# Patient Record
Sex: FEMALE | Race: WHITE | Employment: FULL TIME | URBAN - METROPOLITAN AREA
[De-identification: names, ages, dates, MRNs, and addresses within clinical notes are randomized per-mention and may not be internally consistent; named-entity substitution may affect disease eponyms.]

---

## 2017-01-03 ENCOUNTER — OFFICE VISIT (OUTPATIENT)
Dept: FAMILY MEDICINE CLINIC | Facility: CLINIC | Age: 32
End: 2017-01-03

## 2017-01-03 VITALS
HEART RATE: 77 BPM | OXYGEN SATURATION: 100 % | DIASTOLIC BLOOD PRESSURE: 80 MMHG | HEIGHT: 65 IN | WEIGHT: 249.38 LBS | TEMPERATURE: 99 F | BODY MASS INDEX: 41.55 KG/M2 | SYSTOLIC BLOOD PRESSURE: 130 MMHG | RESPIRATION RATE: 18 BRPM

## 2017-01-03 DIAGNOSIS — J30.81 ALLERGY TO DOGS: ICD-10-CM

## 2017-01-03 DIAGNOSIS — J01.40 SUBACUTE PANSINUSITIS: Primary | ICD-10-CM

## 2017-01-03 PROCEDURE — 99214 OFFICE O/P EST MOD 30 MIN: CPT | Performed by: FAMILY MEDICINE

## 2017-01-03 RX ORDER — SULFAMETHOXAZOLE AND TRIMETHOPRIM 800; 160 MG/1; MG/1
1 TABLET ORAL 2 TIMES DAILY
Qty: 20 TABLET | Refills: 0 | Status: SHIPPED | OUTPATIENT
Start: 2017-01-03 | End: 2017-01-13

## 2017-01-03 NOTE — PROGRESS NOTES
HPI:   Calista Sparks is a 33year old female who presents for upper respiratory symptoms for  1  months.  Patient reports she started to improve with the zpack and steroid in early dec, but as soon as she stopped the meds (within a few days) she started f • Visual impairment      blurry vision   • Muscle weakness      bilateral LEs   • Calculus of kidney    • Attention deficit disorder    • Pseudotumor cerebri syndrome           Past Surgical History    TUBAL LIGATION        Family History   Problem Relat PLAN:   abx indicated; cont nasal steroid; she is holding off on antihistamine in case of allergy testing at upcoming ENT appointment; if not improving in a few days will add steroid pack  The patient indicates agreement and understanding  There are no Anell Bill

## 2017-01-05 RX ORDER — CLONAZEPAM 0.5 MG/1
TABLET ORAL
Qty: 30 TABLET | Refills: 0 | Status: SHIPPED | OUTPATIENT
Start: 2017-01-05 | End: 2017-03-02

## 2017-01-05 NOTE — TELEPHONE ENCOUNTER
Last refill: 12/10/16 #30 with 0 refills  Last Visit: 1-3-2017  Next Visit: none scheduled    Forward to Dr. Nolan Beatty please advise on refills. Thanks.

## 2017-02-01 RX ORDER — DEXTROAMPHETAMINE SACCHARATE, AMPHETAMINE ASPARTATE, DEXTROAMPHETAMINE SULFATE AND AMPHETAMINE SULFATE 7.5; 7.5; 7.5; 7.5 MG/1; MG/1; MG/1; MG/1
30 TABLET ORAL 2 TIMES DAILY
Qty: 60 TABLET | Refills: 0 | Status: SHIPPED | OUTPATIENT
Start: 2017-02-01 | End: 2017-02-28

## 2017-02-01 NOTE — TELEPHONE ENCOUNTER
Last refill: 1/8/17 #60 with 0 refills    Last Visit: 1/3/17    Next Visit: No future appointments. Forward to Dr. Evert Clay please advise on refills. Thanks.

## 2017-02-28 RX ORDER — DEXTROAMPHETAMINE SACCHARATE, AMPHETAMINE ASPARTATE, DEXTROAMPHETAMINE SULFATE AND AMPHETAMINE SULFATE 7.5; 7.5; 7.5; 7.5 MG/1; MG/1; MG/1; MG/1
30 TABLET ORAL 2 TIMES DAILY
Qty: 60 TABLET | Refills: 0 | Status: SHIPPED | OUTPATIENT
Start: 2017-02-28 | End: 2017-03-27

## 2017-03-03 RX ORDER — CLONAZEPAM 0.5 MG/1
TABLET ORAL
Qty: 30 TABLET | Refills: 0 | Status: SHIPPED | OUTPATIENT
Start: 2017-03-03 | End: 2017-04-27

## 2017-03-25 ENCOUNTER — TELEPHONE (OUTPATIENT)
Dept: FAMILY MEDICINE CLINIC | Facility: CLINIC | Age: 32
End: 2017-03-25

## 2017-03-27 ENCOUNTER — TELEPHONE (OUTPATIENT)
Dept: FAMILY MEDICINE CLINIC | Facility: CLINIC | Age: 32
End: 2017-03-27

## 2017-03-27 RX ORDER — DEXTROAMPHETAMINE SACCHARATE, AMPHETAMINE ASPARTATE, DEXTROAMPHETAMINE SULFATE AND AMPHETAMINE SULFATE 7.5; 7.5; 7.5; 7.5 MG/1; MG/1; MG/1; MG/1
30 TABLET ORAL 2 TIMES DAILY
Qty: 60 TABLET | Refills: 0 | Status: SHIPPED | OUTPATIENT
Start: 2017-03-27 | End: 2017-04-25

## 2017-04-06 ENCOUNTER — OFFICE VISIT (OUTPATIENT)
Dept: FAMILY MEDICINE CLINIC | Facility: CLINIC | Age: 32
End: 2017-04-06

## 2017-04-06 VITALS
HEART RATE: 78 BPM | TEMPERATURE: 98 F | SYSTOLIC BLOOD PRESSURE: 136 MMHG | BODY MASS INDEX: 41 KG/M2 | OXYGEN SATURATION: 98 % | WEIGHT: 246 LBS | DIASTOLIC BLOOD PRESSURE: 70 MMHG

## 2017-04-06 DIAGNOSIS — J01.40 ACUTE NON-RECURRENT PANSINUSITIS: Primary | ICD-10-CM

## 2017-04-06 PROCEDURE — 99214 OFFICE O/P EST MOD 30 MIN: CPT | Performed by: FAMILY MEDICINE

## 2017-04-06 RX ORDER — AMOXICILLIN AND CLAVULANATE POTASSIUM 875; 125 MG/1; MG/1
1 TABLET, FILM COATED ORAL 2 TIMES DAILY
Qty: 14 TABLET | Refills: 0 | Status: SHIPPED | OUTPATIENT
Start: 2017-04-06 | End: 2017-04-13

## 2017-04-06 NOTE — PROGRESS NOTES
HPI:   Carlos Crespo is a 33year old female who presents for upper respiratory symptoms for 2 days of bad symptoms, but the past week or so mild congestion and sneezing figured it was allergies.  Symptoms the past 2 days include facial pain, head pain, e Disorder Father 64     CABGx3   • Psychiatric Mother      alcoholic   • Cancer Sister 28     melanoma   • Other[other] [OTHER] Other      kidney transplant (alports)        Smoking Status: Current Some Day Smoker         Packs/Day: 0.25  Years: 12        T this encounter. Meds & Refills for this Visit:  Signed Prescriptions Disp Refills    Amoxicillin-Pot Clavulanate 875-125 MG Oral Tab 14 tablet 0      Sig: Take 1 tablet by mouth 2 (two) times daily.  x7 days           Imaging & Consults:  None

## 2017-04-25 RX ORDER — DEXTROAMPHETAMINE SACCHARATE, AMPHETAMINE ASPARTATE, DEXTROAMPHETAMINE SULFATE AND AMPHETAMINE SULFATE 7.5; 7.5; 7.5; 7.5 MG/1; MG/1; MG/1; MG/1
30 TABLET ORAL 2 TIMES DAILY
Qty: 60 TABLET | Refills: 0 | Status: SHIPPED | OUTPATIENT
Start: 2017-04-25 | End: 2017-05-22

## 2017-04-27 RX ORDER — CLONAZEPAM 0.5 MG/1
TABLET ORAL
Qty: 30 TABLET | Refills: 0 | Status: SHIPPED | OUTPATIENT
Start: 2017-04-27 | End: 2017-05-25

## 2017-05-13 ENCOUNTER — OFFICE VISIT (OUTPATIENT)
Dept: FAMILY MEDICINE CLINIC | Facility: CLINIC | Age: 32
End: 2017-05-13

## 2017-05-13 VITALS
OXYGEN SATURATION: 99 % | TEMPERATURE: 98 F | DIASTOLIC BLOOD PRESSURE: 80 MMHG | SYSTOLIC BLOOD PRESSURE: 108 MMHG | HEART RATE: 96 BPM | RESPIRATION RATE: 20 BRPM

## 2017-05-13 DIAGNOSIS — H65.192 OTHER ACUTE NONSUPPURATIVE OTITIS MEDIA OF LEFT EAR: ICD-10-CM

## 2017-05-13 DIAGNOSIS — J45.31 MILD PERSISTENT ASTHMA WITH ACUTE EXACERBATION: Primary | ICD-10-CM

## 2017-05-13 PROCEDURE — 87880 STREP A ASSAY W/OPTIC: CPT | Performed by: NURSE PRACTITIONER

## 2017-05-13 PROCEDURE — 99214 OFFICE O/P EST MOD 30 MIN: CPT | Performed by: NURSE PRACTITIONER

## 2017-05-13 RX ORDER — PREDNISONE 20 MG/1
20 TABLET ORAL DAILY
Qty: 5 TABLET | Refills: 0 | Status: SHIPPED | OUTPATIENT
Start: 2017-05-13 | End: 2017-05-18

## 2017-05-13 RX ORDER — CODEINE PHOSPHATE AND GUAIFENESIN 10; 100 MG/5ML; MG/5ML
10 SOLUTION ORAL EVERY 6 HOURS PRN
Qty: 120 ML | Refills: 0 | Status: SHIPPED | OUTPATIENT
Start: 2017-05-13 | End: 2017-06-14 | Stop reason: ALTCHOICE

## 2017-05-13 RX ORDER — ALBUTEROL SULFATE 90 UG/1
2 AEROSOL, METERED RESPIRATORY (INHALATION) EVERY 4 HOURS PRN
Qty: 1 INHALER | Refills: 0 | Status: SHIPPED | OUTPATIENT
Start: 2017-05-13 | End: 2017-08-28

## 2017-05-13 RX ORDER — AMOXICILLIN AND CLAVULANATE POTASSIUM 875; 125 MG/1; MG/1
1 TABLET, FILM COATED ORAL 2 TIMES DAILY
Qty: 20 TABLET | Refills: 0 | Status: SHIPPED | OUTPATIENT
Start: 2017-05-13 | End: 2017-05-23

## 2017-05-13 NOTE — PROGRESS NOTES
CHIEF COMPLAINT:   Patient presents with:  URI      HPI:   Everette Tellez is a 33year old female who presents for upper respiratory symptoms for  1 weeks. Patient reports sore throat, congestion, cough with white colored sputum, ear pain, wheezing.  Sympt Comment: smokes occasionally    Alcohol Use: Yes           0.0 oz/week       0 Standard drinks or equivalent per week       Comment: rare 3 x per year         REVIEW OF SYSTEMS:   GENERAL: poor appetite  SKIN: no rashes or abnormal skin lesions  HEENT: S guaiFENesin-codeine (CHERATUSSIN AC) 100-10 MG/5ML Oral Solution 120 mL 0      Sig: Take 10 mL by mouth every 6 (six) hours as needed.       Albuterol Sulfate HFA (PROAIR HFA) 108 (90 Base) MCG/ACT Inhalation Aero Soln 1 Inhaler 0      Sig: Inhale 2 puffs People with asthma have very sensitive airways. This means the airways react to certain things called triggers (such as pollen, dust, or smoke) and become swollen and narrowed. Inflammation makes the airways swollen and narrowed.  This is a chronic (long-la · Peak flow less than 50% of your personal best, if you use peak flow monitoring  Because asthma is a long-term condition, it is important to work with your health care provider to manage it. If you smoke, get help to quit.  Know your triggers and figure ou

## 2017-05-13 NOTE — PATIENT INSTRUCTIONS
Hold the Symbicort until off the prednisone  Follow up with PCP if not better      Understanding Asthma    Asthma causes swelling and narrowing of the airways in your lungs. No one is exactly sure what causes asthma.  It is believed to be a combination of i Over time, chronic mild inflammation can lead to permanent scarring of airways and loss of lung function. Moderate flare-ups  When sensitive airways are irritated by a trigger, the muscles around the airways tighten. The lining of the airways swells.  Thic © 8540-4423 [address], [address] [place]. All rights reserved. This information is not intended as a substitute for professional medical care. Always follow your healthcare professional's instructions.

## 2017-05-18 ENCOUNTER — TELEPHONE (OUTPATIENT)
Dept: FAMILY MEDICINE CLINIC | Facility: CLINIC | Age: 32
End: 2017-05-18

## 2017-05-22 ENCOUNTER — TELEPHONE (OUTPATIENT)
Dept: FAMILY MEDICINE CLINIC | Facility: CLINIC | Age: 32
End: 2017-05-22

## 2017-05-22 ENCOUNTER — HOSPITAL ENCOUNTER (OUTPATIENT)
Dept: GENERAL RADIOLOGY | Age: 32
Discharge: HOME | End: 2017-05-22
Attending: FAMILY MEDICINE
Payer: MEDICAID

## 2017-05-22 ENCOUNTER — OFFICE VISIT (OUTPATIENT)
Dept: FAMILY MEDICINE CLINIC | Facility: CLINIC | Age: 32
End: 2017-05-22

## 2017-05-22 VITALS
RESPIRATION RATE: 14 BRPM | HEART RATE: 80 BPM | DIASTOLIC BLOOD PRESSURE: 80 MMHG | TEMPERATURE: 97 F | WEIGHT: 253 LBS | BODY MASS INDEX: 42 KG/M2 | SYSTOLIC BLOOD PRESSURE: 120 MMHG

## 2017-05-22 DIAGNOSIS — R06.02 SOB (SHORTNESS OF BREATH): ICD-10-CM

## 2017-05-22 DIAGNOSIS — F90.0 ATTENTION DEFICIT HYPERACTIVITY DISORDER (ADHD), PREDOMINANTLY INATTENTIVE TYPE: ICD-10-CM

## 2017-05-22 DIAGNOSIS — R05.9 COUGH: Primary | ICD-10-CM

## 2017-05-22 DIAGNOSIS — R06.00 DOE (DYSPNEA ON EXERTION): ICD-10-CM

## 2017-05-22 DIAGNOSIS — B37.0 THRUSH: ICD-10-CM

## 2017-05-22 DIAGNOSIS — J30.1 SEASONAL ALLERGIC RHINITIS DUE TO POLLEN: ICD-10-CM

## 2017-05-22 DIAGNOSIS — R05.9 COUGH: ICD-10-CM

## 2017-05-22 DIAGNOSIS — J45.41 MODERATE PERSISTENT ASTHMA WITH ACUTE EXACERBATION: ICD-10-CM

## 2017-05-22 PROCEDURE — 99214 OFFICE O/P EST MOD 30 MIN: CPT | Performed by: FAMILY MEDICINE

## 2017-05-22 PROCEDURE — 71020 XR CHEST PA + LAT CHEST (CPT=71020): CPT | Performed by: FAMILY MEDICINE

## 2017-05-22 RX ORDER — DEXTROAMPHETAMINE SACCHARATE, AMPHETAMINE ASPARTATE, DEXTROAMPHETAMINE SULFATE AND AMPHETAMINE SULFATE 7.5; 7.5; 7.5; 7.5 MG/1; MG/1; MG/1; MG/1
30 TABLET ORAL 2 TIMES DAILY
Qty: 60 TABLET | Refills: 0 | Status: SHIPPED | OUTPATIENT
Start: 2017-05-22 | End: 2017-06-19

## 2017-05-22 RX ORDER — MONTELUKAST SODIUM 10 MG/1
10 TABLET ORAL NIGHTLY
Qty: 90 TABLET | Refills: 3 | Status: SHIPPED | OUTPATIENT
Start: 2017-05-22 | End: 2018-05-17

## 2017-05-22 RX ORDER — AZITHROMYCIN 250 MG/1
TABLET, FILM COATED ORAL
Qty: 6 TABLET | Refills: 0 | Status: SHIPPED | OUTPATIENT
Start: 2017-05-22 | End: 2017-06-14 | Stop reason: ALTCHOICE

## 2017-05-22 RX ORDER — IPRATROPIUM BROMIDE AND ALBUTEROL SULFATE 2.5; .5 MG/3ML; MG/3ML
3 SOLUTION RESPIRATORY (INHALATION) EVERY 4 HOURS PRN
Qty: 90 ML | Refills: 1 | Status: SHIPPED | OUTPATIENT
Start: 2017-05-22 | End: 2017-06-14 | Stop reason: ALTCHOICE

## 2017-05-22 NOTE — TELEPHONE ENCOUNTER
Spoke with the pt and advised of the test results and recommendations- she v/u  Advised that the adderall is ready to be picked up- she v/u

## 2017-05-22 NOTE — PROGRESS NOTES
Karma Diggs is a 27year old female. HPI:   Pt is here for ER f/u. ER: Pt went to [facility] last thurs d/t asthma exacerbation.  She had been in [facility] a week ago Saturday for URI symptoms (put on prednisone, albuterol and augmentin for asthma fla 4 (four) times daily. Swish, retain in mouth as long as possible then swallow for 2 weeks Disp: 280 mL Rfl: 0   amphetamine-dextroamphetamine (ADDERALL) 30 MG Oral Tab Take 1 tablet (30 mg total) by mouth 2 (two) times daily.  Disp: 60 tablet Rfl: 0   Amoxi Smoker         Packs/Day: 0.25  Years: 16        Types: Cigarettes    Smokeless Status: Never Used                        Comment: smokes occasionally    Alcohol Use: Yes           0.0 oz/week       0 Standard drinks or equivalent per week       Comment: frederick allergic rhinitis due to pollen  Cont OTC antihistamine and NCS; add singulair    7. Moderate persistent asthma with acute exacerbation  See above; cont symbicort         The patient indicates understanding of these issues and agrees to the plan.   The deven

## 2017-05-22 NOTE — TELEPHONE ENCOUNTER
----- Message from Tessy Rogel MD sent at 5/22/2017  1:11 PM CDT -----  Please notify pt her CXR is normal, no pneumonia. We'll treat sinusitis, allergies and asthma as we discussed in office (zpack, duoneb and singulair). Also adderall script printed.  L

## 2017-05-25 ENCOUNTER — TELEPHONE (OUTPATIENT)
Dept: FAMILY MEDICINE CLINIC | Facility: CLINIC | Age: 32
End: 2017-05-25

## 2017-05-26 RX ORDER — CLONAZEPAM 0.5 MG/1
TABLET ORAL
Qty: 30 TABLET | Refills: 0 | Status: SHIPPED | OUTPATIENT
Start: 2017-05-26 | End: 2017-07-24

## 2017-06-09 ENCOUNTER — TELEPHONE (OUTPATIENT)
Dept: FAMILY MEDICINE CLINIC | Facility: CLINIC | Age: 32
End: 2017-06-09

## 2017-06-09 DIAGNOSIS — T14.8XXA BRUISING: Primary | ICD-10-CM

## 2017-06-12 ENCOUNTER — NURSE ONLY (OUTPATIENT)
Dept: FAMILY MEDICINE CLINIC | Facility: CLINIC | Age: 32
End: 2017-06-12

## 2017-06-12 DIAGNOSIS — T14.8XXA BRUISING: ICD-10-CM

## 2017-06-12 PROCEDURE — 36415 COLL VENOUS BLD VENIPUNCTURE: CPT | Performed by: FAMILY MEDICINE

## 2017-06-12 PROCEDURE — 85610 PROTHROMBIN TIME: CPT | Performed by: FAMILY MEDICINE

## 2017-06-12 PROCEDURE — 80053 COMPREHEN METABOLIC PANEL: CPT | Performed by: FAMILY MEDICINE

## 2017-06-12 PROCEDURE — 85730 THROMBOPLASTIN TIME PARTIAL: CPT | Performed by: FAMILY MEDICINE

## 2017-06-12 PROCEDURE — 85025 COMPLETE CBC W/AUTO DIFF WBC: CPT | Performed by: FAMILY MEDICINE

## 2017-06-12 NOTE — PROGRESS NOTES
Pt came to the office to have labs drawn as ordered by Dr. Heriberto Xavier. Light green, lavender, and two blue tubes drawn with butterfly needle. Pt tolerated well.

## 2017-06-14 ENCOUNTER — OFFICE VISIT (OUTPATIENT)
Dept: FAMILY MEDICINE CLINIC | Facility: CLINIC | Age: 32
End: 2017-06-14

## 2017-06-14 VITALS
SYSTOLIC BLOOD PRESSURE: 110 MMHG | RESPIRATION RATE: 14 BRPM | HEART RATE: 80 BPM | DIASTOLIC BLOOD PRESSURE: 70 MMHG | WEIGHT: 251 LBS | TEMPERATURE: 98 F | BODY MASS INDEX: 42 KG/M2

## 2017-06-14 DIAGNOSIS — G89.29 CHRONIC ABDOMINAL PAIN: Primary | ICD-10-CM

## 2017-06-14 DIAGNOSIS — J45.41 MODERATE PERSISTENT ASTHMA WITH ACUTE EXACERBATION: ICD-10-CM

## 2017-06-14 DIAGNOSIS — R10.9 CHRONIC ABDOMINAL PAIN: Primary | ICD-10-CM

## 2017-06-14 DIAGNOSIS — T14.8XXA BRUISING: ICD-10-CM

## 2017-06-14 DIAGNOSIS — R68.81 EARLY SATIETY: ICD-10-CM

## 2017-06-14 PROCEDURE — 99214 OFFICE O/P EST MOD 30 MIN: CPT | Performed by: FAMILY MEDICINE

## 2017-06-14 NOTE — PROGRESS NOTES
Oneal Meade is a 27year old female. HPI:   Pt is here concerned about excessive bruising. She points out an area on her left front of ankle that looked bruised for 3 weeks. Her toes also look purple and bruised and get tingly.   Her hand also bruised daily. Disp: 1 Inhaler Rfl: 6   Cholecalciferol (VITAMIN D-3) 5000 UNITS Oral Tab Take 1 tablet by mouth.  Disp:  Rfl:    ferrous sulfate 325 (65 FE) MG Oral Tab EC Take 325 mg by mouth daily with breakfast. Disp:  Rfl:         HISTORY:  Past Medical Histor murmur  GI: good BS's,no masses, HSM; + reproducible LUQ tenderness no rebound or guarding; + somewhat distended/taut appearing upper abdomen  EXTREMITIES: no cyanosis, clubbing or edema    ASSESSMENT AND PLAN:   Diagnoses and all orders for this visit:

## 2017-06-19 RX ORDER — DEXTROAMPHETAMINE SACCHARATE, AMPHETAMINE ASPARTATE, DEXTROAMPHETAMINE SULFATE AND AMPHETAMINE SULFATE 7.5; 7.5; 7.5; 7.5 MG/1; MG/1; MG/1; MG/1
30 TABLET ORAL 2 TIMES DAILY
Qty: 60 TABLET | Refills: 0 | Status: SHIPPED | OUTPATIENT
Start: 2017-06-19 | End: 2017-07-17

## 2017-06-19 NOTE — TELEPHONE ENCOUNTER
Patient notified and verbalized understanding.   Script placed in patient  folder    Patient also asking if her CT was approved  Patient notified PA has been processed and she can call to schedule

## 2017-06-26 RX ORDER — IPRATROPIUM BROMIDE AND ALBUTEROL SULFATE 2.5; .5 MG/3ML; MG/3ML
SOLUTION RESPIRATORY (INHALATION)
Qty: 90 ML | Refills: 0 | Status: SHIPPED | OUTPATIENT
Start: 2017-06-26 | End: 2017-08-07

## 2017-06-26 RX ORDER — BUDESONIDE AND FORMOTEROL FUMARATE DIHYDRATE 160; 4.5 UG/1; UG/1
AEROSOL RESPIRATORY (INHALATION)
Qty: 1 INHALER | Refills: 0 | Status: SHIPPED | OUTPATIENT
Start: 2017-06-26 | End: 2018-12-03

## 2017-06-26 NOTE — TELEPHONE ENCOUNTER
Last OV 6/14/17, No future appointments.     Last rx Duoneb given 5/22/17  Last rx Symbicort given 3/15/16

## 2017-07-11 ENCOUNTER — TELEPHONE (OUTPATIENT)
Dept: FAMILY MEDICINE CLINIC | Facility: CLINIC | Age: 32
End: 2017-07-11

## 2017-07-11 NOTE — TELEPHONE ENCOUNTER
Called the pt to let her know that the letter she requested for work is ready to be picked up- she v/u

## 2017-07-17 RX ORDER — DEXTROAMPHETAMINE SACCHARATE, AMPHETAMINE ASPARTATE, DEXTROAMPHETAMINE SULFATE AND AMPHETAMINE SULFATE 7.5; 7.5; 7.5; 7.5 MG/1; MG/1; MG/1; MG/1
30 TABLET ORAL 2 TIMES DAILY
Qty: 60 TABLET | Refills: 0 | Status: SHIPPED | OUTPATIENT
Start: 2017-07-17 | End: 2017-08-07

## 2017-07-21 ENCOUNTER — HOSPITAL ENCOUNTER (OUTPATIENT)
Dept: CT IMAGING | Age: 32
Discharge: HOME | End: 2017-07-21
Attending: FAMILY MEDICINE
Payer: COMMERCIAL

## 2017-07-21 DIAGNOSIS — R10.9 CHRONIC ABDOMINAL PAIN: ICD-10-CM

## 2017-07-21 DIAGNOSIS — R68.81 EARLY SATIETY: ICD-10-CM

## 2017-07-21 DIAGNOSIS — G89.29 CHRONIC ABDOMINAL PAIN: ICD-10-CM

## 2017-07-21 PROCEDURE — 74177 CT ABD & PELVIS W/CONTRAST: CPT | Performed by: FAMILY MEDICINE

## 2017-07-22 ENCOUNTER — TELEPHONE (OUTPATIENT)
Dept: FAMILY MEDICINE CLINIC | Facility: CLINIC | Age: 32
End: 2017-07-22

## 2017-07-22 NOTE — TELEPHONE ENCOUNTER
----- Message from Funmi Patel MD sent at 7/22/2017  8:02 AM CDT -----  Please notify pt of great news, her CT scan is normal. However, if she continues to have early satiety and bloating I'd like her to see GI to see if EGD is recommended or any other r

## 2017-07-24 RX ORDER — CLONAZEPAM 0.5 MG/1
TABLET ORAL
Qty: 30 TABLET | Refills: 0 | Status: SHIPPED
Start: 2017-07-24 | End: 2018-12-03

## 2017-08-07 RX ORDER — IPRATROPIUM BROMIDE AND ALBUTEROL SULFATE 2.5; .5 MG/3ML; MG/3ML
SOLUTION RESPIRATORY (INHALATION)
Qty: 90 ML | Refills: 3 | Status: SHIPPED | OUTPATIENT
Start: 2017-08-07 | End: 2017-09-02

## 2017-08-07 RX ORDER — DEXTROAMPHETAMINE SACCHARATE, AMPHETAMINE ASPARTATE, DEXTROAMPHETAMINE SULFATE AND AMPHETAMINE SULFATE 7.5; 7.5; 7.5; 7.5 MG/1; MG/1; MG/1; MG/1
30 TABLET ORAL 2 TIMES DAILY
Qty: 60 TABLET | Refills: 0 | Status: SHIPPED | OUTPATIENT
Start: 2017-08-07 | End: 2017-09-06

## 2017-08-07 NOTE — TELEPHONE ENCOUNTER
amphetamine-dextroamphetamine (ADDERALL) 30 MG Oral Tab 60 tablet 0 7/17/2017 8/16/2017    Sig - Route: Take 1 tablet (30 mg total) by mouth 2 (two) times daily. - Oral      Patient will  script.

## 2017-08-25 ENCOUNTER — OFFICE VISIT (OUTPATIENT)
Dept: FAMILY MEDICINE CLINIC | Facility: CLINIC | Age: 32
End: 2017-08-25

## 2017-08-25 VITALS
TEMPERATURE: 98 F | SYSTOLIC BLOOD PRESSURE: 170 MMHG | WEIGHT: 253.81 LBS | OXYGEN SATURATION: 98 % | BODY MASS INDEX: 42 KG/M2 | DIASTOLIC BLOOD PRESSURE: 100 MMHG | HEART RATE: 71 BPM

## 2017-08-25 DIAGNOSIS — J45.31 MILD PERSISTENT ASTHMA WITH ACUTE EXACERBATION: ICD-10-CM

## 2017-08-25 PROCEDURE — 99214 OFFICE O/P EST MOD 30 MIN: CPT | Performed by: FAMILY MEDICINE

## 2017-08-25 RX ORDER — AZITHROMYCIN 250 MG/1
250 TABLET, FILM COATED ORAL DAILY
COMMUNITY
End: 2018-12-03 | Stop reason: ALTCHOICE

## 2017-08-25 RX ORDER — CETIRIZINE HYDROCHLORIDE 10 MG/1
10 TABLET ORAL DAILY
COMMUNITY

## 2017-08-25 RX ORDER — PREDNISONE 20 MG/1
20 TABLET ORAL DAILY
COMMUNITY
End: 2018-12-03 | Stop reason: ALTCHOICE

## 2017-08-25 RX ORDER — BENZONATATE 100 MG/1
100 CAPSULE ORAL 3 TIMES DAILY PRN
COMMUNITY
End: 2018-12-03 | Stop reason: ALTCHOICE

## 2017-08-25 RX ORDER — FLUTICASONE PROPIONATE 50 MCG
2 SPRAY, SUSPENSION (ML) NASAL DAILY
COMMUNITY

## 2017-08-25 RX ORDER — CODEINE PHOSPHATE AND GUAIFENESIN 10; 100 MG/5ML; MG/5ML
SOLUTION ORAL EVERY 6 HOURS PRN
Qty: 120 ML | Refills: 0 | Status: SHIPPED
Start: 2017-08-25 | End: 2017-08-29

## 2017-08-25 NOTE — PROGRESS NOTES
HPI:   Stephie Gibson is a 27year old female who presents for upper respiratory symptoms for 10 days.  Symptoms include started with what she thought were allergy symptoms after cleaning but then she kept coughing and it got harsh, even wet herself she's Aerosol Inhale 2 puffs into the lungs 2 (two) times daily. Disp: 1 Inhaler Rfl: 0   amphetamine-dextroamphetamine (ADDERALL) 30 MG Oral Tab Take 1 tablet (30 mg total) by mouth 2 (two) times daily.  Disp: 60 tablet Rfl: 0   IPRATROPIUM-ALBUTEROL 0.5-2.5 (3) Comment: smokes occasionally  Alcohol use: Yes           0.0 oz/week     Comment: rare 3 x per year         REVIEW OF SYSTEMS:   GENERAL: a little tired and achy  SKIN: no rashes  EYES:no redness or discharge  HEENT: congested, sore throat  LUNGS: + s allergist  [phone]  [address] [place] [address]   Michell Medina

## 2017-08-25 NOTE — PATIENT INSTRUCTIONS
Make sure you are using duoneb (ipratropium-albuterol) in your neb, not just albuterol.  If you don't have the ipratropium-albuterol combo call your pharmacy as they have refills on hand for you there  -----  Just for the next few weeks here use Dulera 2 pu

## 2017-08-26 DIAGNOSIS — J45.41 MODERATE PERSISTENT ASTHMA WITH ACUTE EXACERBATION: Primary | ICD-10-CM

## 2017-08-26 DIAGNOSIS — J30.1 SEASONAL ALLERGIC RHINITIS DUE TO POLLEN, UNSPECIFIED CHRONICITY: ICD-10-CM

## 2017-08-26 RX ORDER — SOFT LENS DISINFECTANT
SOLUTION, NON-ORAL MISCELLANEOUS
Qty: 1 DEVICE | Refills: 0 | Status: SHIPPED | OUTPATIENT
Start: 2017-08-26

## 2017-08-26 NOTE — TELEPHONE ENCOUNTER
PT CALLED AND ADV THAT SHE NEEDS A NEW NEB MACHINE--PT ADV IT JUST BROKE.     CAN YOU PLEASE SEND IN SCRIPT TO Alexander Rodriguez 34 [address] 47    ANY QUESTIONS PLEASE CALL    THANK YOU

## 2017-08-29 ENCOUNTER — TELEPHONE (OUTPATIENT)
Dept: FAMILY MEDICINE CLINIC | Facility: CLINIC | Age: 32
End: 2017-08-29

## 2017-08-29 DIAGNOSIS — J45.31 MILD PERSISTENT ASTHMA WITH ACUTE EXACERBATION: ICD-10-CM

## 2017-08-29 DIAGNOSIS — R05.9 COUGH: Primary | ICD-10-CM

## 2017-08-29 RX ORDER — CODEINE PHOSPHATE AND GUAIFENESIN 10; 100 MG/5ML; MG/5ML
SOLUTION ORAL EVERY 6 HOURS PRN
Qty: 120 ML | Refills: 0 | Status: SHIPPED
Start: 2017-08-29 | End: 2017-08-31

## 2017-08-29 NOTE — TELEPHONE ENCOUNTER
Pt was told to call back by Monday if she still wasn't feeling good, She isn't and its getting harder to breathe.

## 2017-08-29 NOTE — TELEPHONE ENCOUNTER
Spoke with Dr. Sara Jacob and she states that she would like to refill the cough syrup and order a whopping cough test. Tell the pt to have a low threshold for going to the ER/UC if her breathing worsens-

## 2017-08-29 NOTE — TELEPHONE ENCOUNTER
Spoke with the pt and she states that when she coughs she feels like she is going to pass out    She  Is more SOB with activity and worse with cough-  She feels like she can not get any air in- she feels like she is gasping for air  She states that the onl

## 2017-08-29 NOTE — TELEPHONE ENCOUNTER
Called the pt and advised of the instructions for the cough syrup and the testing- advised to go to the ER/UC if her breathing gets worse- she v/u

## 2017-08-31 ENCOUNTER — TELEPHONE (OUTPATIENT)
Dept: FAMILY MEDICINE CLINIC | Facility: CLINIC | Age: 32
End: 2017-08-31

## 2017-08-31 DIAGNOSIS — J45.31 MILD PERSISTENT ASTHMA WITH ACUTE EXACERBATION: ICD-10-CM

## 2017-08-31 RX ORDER — CODEINE PHOSPHATE AND GUAIFENESIN 10; 100 MG/5ML; MG/5ML
SOLUTION ORAL EVERY 6 HOURS PRN
Qty: 60 ML | Refills: 0 | Status: SHIPPED | OUTPATIENT
Start: 2017-08-31 | End: 2017-09-02

## 2017-08-31 RX ORDER — OXYBUTYNIN CHLORIDE 5 MG/1
TABLET ORAL
Qty: 120 ML | Refills: 0 | OUTPATIENT
Start: 2017-08-31

## 2017-08-31 NOTE — TELEPHONE ENCOUNTER
Patient notified pertussis result not back yet. Patient states she is going to be out of cough medicine before tomorrow.     States she is taking 10 ml Q6 hours  States she needs refill today    Routed to Dr Fernandes Fearing as covering provider to advise

## 2017-08-31 NOTE — TELEPHONE ENCOUNTER
Pt called, was wondering if the test results are back and what the status on the cough medicine is.    Please call pt at [phone]

## 2017-09-01 ENCOUNTER — TELEPHONE (OUTPATIENT)
Dept: FAMILY MEDICINE CLINIC | Facility: CLINIC | Age: 32
End: 2017-09-01

## 2017-09-01 NOTE — TELEPHONE ENCOUNTER
Unfortunately I don't have the whooping cough test yet, but man this is sounding suspicious.  Thankfully if that's the case the symptoms feel worse than the actual severity/the infection is posing, but nonetheless since I Don't have a result and she is feel

## 2017-09-01 NOTE — TELEPHONE ENCOUNTER
Sulma-she states that when she is coughing that she feels like she is choking, her breathing is not worse just that the cough is what is worse.  She is using the cough syrup 10 ml every 6 hours and she is counting down until she can take the cough syrup agai

## 2017-09-01 NOTE — TELEPHONE ENCOUNTER
Pt called very upset saying she is still sick and needs medication or may have to go to the hospital. Please call back

## 2017-09-02 ENCOUNTER — TELEPHONE (OUTPATIENT)
Dept: FAMILY MEDICINE CLINIC | Facility: CLINIC | Age: 32
End: 2017-09-02

## 2017-09-02 DIAGNOSIS — J45.31 MILD PERSISTENT ASTHMA WITH ACUTE EXACERBATION: ICD-10-CM

## 2017-09-02 RX ORDER — CODEINE PHOSPHATE AND GUAIFENESIN 10; 100 MG/5ML; MG/5ML
SOLUTION ORAL EVERY 6 HOURS PRN
Qty: 60 ML | Refills: 0 | Status: SHIPPED
Start: 2017-09-02 | End: 2017-09-06

## 2017-09-02 RX ORDER — IPRATROPIUM BROMIDE AND ALBUTEROL SULFATE 2.5; .5 MG/3ML; MG/3ML
3 SOLUTION RESPIRATORY (INHALATION) EVERY 4 HOURS PRN
Qty: 90 ML | Refills: 0 | Status: SHIPPED | OUTPATIENT
Start: 2017-09-02 | End: 2017-09-09

## 2017-09-02 NOTE — TELEPHONE ENCOUNTER
Phone call from patient. Went to ER yesterday. Needs a refill of Duoneb and Cheratussin. Was given levofloxacin. Not feeling better yet.   Had neg strep, neg flu test.

## 2017-09-02 NOTE — TELEPHONE ENCOUNTER
Pt was told to go to the hospital by nurse yesterday and pt said she is calling to give an update on how it went and to ask for a refill of her \" breathing meds\" .

## 2017-09-05 ENCOUNTER — TELEPHONE (OUTPATIENT)
Dept: FAMILY MEDICINE CLINIC | Facility: CLINIC | Age: 32
End: 2017-09-05

## 2017-09-05 DIAGNOSIS — J45.31 MILD PERSISTENT ASTHMA WITH ACUTE EXACERBATION: ICD-10-CM

## 2017-09-06 ENCOUNTER — OFFICE VISIT (OUTPATIENT)
Dept: FAMILY MEDICINE CLINIC | Facility: CLINIC | Age: 32
End: 2017-09-06

## 2017-09-06 VITALS
DIASTOLIC BLOOD PRESSURE: 84 MMHG | TEMPERATURE: 97 F | WEIGHT: 259 LBS | BODY MASS INDEX: 43 KG/M2 | HEART RATE: 87 BPM | OXYGEN SATURATION: 98 % | SYSTOLIC BLOOD PRESSURE: 130 MMHG

## 2017-09-06 DIAGNOSIS — A37.90 WHOOPING COUGH: Primary | ICD-10-CM

## 2017-09-06 PROCEDURE — 99213 OFFICE O/P EST LOW 20 MIN: CPT | Performed by: FAMILY MEDICINE

## 2017-09-06 RX ORDER — CODEINE PHOSPHATE AND GUAIFENESIN 10; 100 MG/5ML; MG/5ML
10 SOLUTION ORAL EVERY 6 HOURS PRN
Qty: 600 ML | Refills: 0 | Status: SHIPPED
Start: 2017-09-06 | End: 2017-09-23

## 2017-09-06 RX ORDER — DEXTROAMPHETAMINE SACCHARATE, AMPHETAMINE ASPARTATE, DEXTROAMPHETAMINE SULFATE AND AMPHETAMINE SULFATE 7.5; 7.5; 7.5; 7.5 MG/1; MG/1; MG/1; MG/1
30 TABLET ORAL 2 TIMES DAILY
Qty: 60 TABLET | Refills: 0 | Status: CANCELLED
Start: 2017-09-06 | End: 2017-10-06

## 2017-09-06 RX ORDER — CODEINE PHOSPHATE AND GUAIFENESIN 10; 100 MG/5ML; MG/5ML
SOLUTION ORAL EVERY 6 HOURS PRN
Qty: 60 ML | Refills: 0
Start: 2017-09-06

## 2017-09-06 NOTE — TELEPHONE ENCOUNTER
Patient has appt tonight sent message asking if she is ok waiting for the refill tonight or would she like this sent sooner. Awaiting reply.

## 2017-09-06 NOTE — TELEPHONE ENCOUNTER
From: Aquiles Mccrary  Sent: 9/5/2017 4:46 PM CDT  Subject: Medication Renewal Request    Elizabeth Estephanie.  Macho Clemons would like a refill of the following medications:  guaiFENesin-codeine (CHERATUSSIN AC) 100-10 MG/5ML Oral Solution Juventino Rahman MD]    Preferred p

## 2017-09-06 NOTE — TELEPHONE ENCOUNTER
Future Appointments  Date Time Provider Ольга Cheng   9/6/2017 6:00 PM Corrina Pereyra MD [facility] MARY LOU Milian
I can do 6:20 tomorrow or hospitals f/u spot friday
PT CALLED AND ADV THAT SHE WAS IN THE HOSPITAL OVER THE WEEKEND AND IS SICK  WOULD NOT SPECIFY AND WAS TOLD THAT DR Jose Elias Davenport KNEW WHAT WAS GOING ON.     ADV PT THERE WERE NO OPENINGS THIS WEEK AND WAS ADV THAT THAT WAS NOT ACCEPTABLE AND NEEDS TO BE SEEN    PLE
Routed to  to schedule
(0) swallows foods/liquids without difficulty

## 2017-09-07 ENCOUNTER — TELEPHONE (OUTPATIENT)
Dept: FAMILY MEDICINE CLINIC | Facility: CLINIC | Age: 32
End: 2017-09-07

## 2017-09-07 NOTE — TELEPHONE ENCOUNTER
From: Roe Still  Sent: 9/6/2017 7:51 PM CDT  Subject: Medication Renewal Request    Ha Alcantara would like a refill of the following medications:  amphetamine-dextroamphetamine (ADDERALL) 30 MG Oral Tab Paul Miranda MD]    Preferred pharmacy:

## 2017-09-07 NOTE — TELEPHONE ENCOUNTER
Spoke with Alisha Jackson at [facility] who states positive pertussis result was already reported by the lab

## 2017-09-08 RX ORDER — DEXTROAMPHETAMINE SACCHARATE, AMPHETAMINE ASPARTATE, DEXTROAMPHETAMINE SULFATE AND AMPHETAMINE SULFATE 7.5; 7.5; 7.5; 7.5 MG/1; MG/1; MG/1; MG/1
30 TABLET ORAL DAILY
Qty: 30 TABLET | Refills: 0 | Status: SHIPPED | OUTPATIENT
Start: 2017-10-07 | End: 2017-11-06

## 2017-09-08 RX ORDER — DEXTROAMPHETAMINE SACCHARATE, AMPHETAMINE ASPARTATE, DEXTROAMPHETAMINE SULFATE AND AMPHETAMINE SULFATE 7.5; 7.5; 7.5; 7.5 MG/1; MG/1; MG/1; MG/1
30 TABLET ORAL DAILY
Qty: 30 TABLET | Refills: 0 | Status: SHIPPED | OUTPATIENT
Start: 2017-09-08 | End: 2017-09-13

## 2017-09-08 RX ORDER — DEXTROAMPHETAMINE SACCHARATE, AMPHETAMINE ASPARTATE, DEXTROAMPHETAMINE SULFATE AND AMPHETAMINE SULFATE 7.5; 7.5; 7.5; 7.5 MG/1; MG/1; MG/1; MG/1
30 TABLET ORAL DAILY
Qty: 30 TABLET | Refills: 0 | Status: SHIPPED | OUTPATIENT
Start: 2017-11-06 | End: 2017-12-06

## 2017-09-10 RX ORDER — IPRATROPIUM BROMIDE AND ALBUTEROL SULFATE 2.5; .5 MG/3ML; MG/3ML
3 SOLUTION RESPIRATORY (INHALATION) EVERY 4 HOURS PRN
Qty: 90 ML | Refills: 0 | Status: SHIPPED
Start: 2017-09-10 | End: 2020-10-17

## 2017-09-11 RX ORDER — IPRATROPIUM BROMIDE AND ALBUTEROL SULFATE 2.5; .5 MG/3ML; MG/3ML
SOLUTION RESPIRATORY (INHALATION)
Qty: 90 ML | Refills: 0 | OUTPATIENT
Start: 2017-09-11

## 2017-09-13 RX ORDER — DEXTROAMPHETAMINE SACCHARATE, AMPHETAMINE ASPARTATE, DEXTROAMPHETAMINE SULFATE AND AMPHETAMINE SULFATE 7.5; 7.5; 7.5; 7.5 MG/1; MG/1; MG/1; MG/1
30 TABLET ORAL 2 TIMES DAILY
Qty: 60 TABLET | Refills: 0 | Status: SHIPPED | OUTPATIENT
Start: 2017-10-13 | End: 2017-11-12

## 2017-09-13 RX ORDER — DEXTROAMPHETAMINE SACCHARATE, AMPHETAMINE ASPARTATE, DEXTROAMPHETAMINE SULFATE AND AMPHETAMINE SULFATE 7.5; 7.5; 7.5; 7.5 MG/1; MG/1; MG/1; MG/1
30 TABLET ORAL 2 TIMES DAILY
Qty: 60 TABLET | Refills: 0 | Status: SHIPPED | OUTPATIENT
Start: 2017-09-13 | End: 2017-10-13

## 2017-09-13 RX ORDER — DEXTROAMPHETAMINE SACCHARATE, AMPHETAMINE ASPARTATE, DEXTROAMPHETAMINE SULFATE AND AMPHETAMINE SULFATE 7.5; 7.5; 7.5; 7.5 MG/1; MG/1; MG/1; MG/1
30 TABLET ORAL 2 TIMES DAILY
Qty: 60 TABLET | Refills: 0 | Status: CANCELLED | OUTPATIENT
Start: 2017-09-13 | End: 2017-10-13

## 2017-09-13 RX ORDER — DEXTROAMPHETAMINE SACCHARATE, AMPHETAMINE ASPARTATE, DEXTROAMPHETAMINE SULFATE AND AMPHETAMINE SULFATE 7.5; 7.5; 7.5; 7.5 MG/1; MG/1; MG/1; MG/1
30 TABLET ORAL DAILY
Qty: 30 TABLET | Refills: 0
Start: 2017-09-13 | End: 2017-09-13

## 2017-09-13 RX ORDER — DEXTROAMPHETAMINE SACCHARATE, AMPHETAMINE ASPARTATE, DEXTROAMPHETAMINE SULFATE AND AMPHETAMINE SULFATE 7.5; 7.5; 7.5; 7.5 MG/1; MG/1; MG/1; MG/1
30 TABLET ORAL 2 TIMES DAILY
Qty: 60 TABLET | Refills: 0 | Status: SHIPPED | OUTPATIENT
Start: 2017-11-12 | End: 2017-12-01

## 2017-09-13 NOTE — TELEPHONE ENCOUNTER
From: Everette Cost  Sent: 9/13/2017 6:02 PM CDT  Subject: Medication Renewal Request    Jose Monroe.  Maricruz would like a refill of the following medications:     amphetamine-dextroamphetamine (ADDERALL) 30 MG Oral Tab Jodee Meek MD]   Patient Comment:

## 2017-09-14 NOTE — TELEPHONE ENCOUNTER
1 of the incorrect scripts was filled yesterday. Patient returned the 2 remaining scripts. Returned scripts shredded.

## 2017-09-22 PROBLEM — Z86.19 HISTORY OF PERTUSSIS: Status: ACTIVE | Noted: 2017-09-22

## 2017-09-22 NOTE — PROGRESS NOTES
Swati Hernandez is a 27year old female. HPI:   PT is here to f/u on her cough again. Since our last visit she has had another ER visit d/t severe coughing spells followed by episodes of feeling like she can't breathe.  In ER there wasn't much they could Take 100 mg by mouth 3 (three) times daily as needed for cough. Disp:  Rfl:    Mometasone Furo-Formoterol Fum (DULERA) 200-5 MCG/ACT Inhalation Aerosol Inhale 2 puffs into the lungs 2 (two) times daily.  Disp: 1 Inhaler Rfl: 0   Fluticasone Propionate 50 MC otherwise, in fact, doesn't feel that sick overall and in between coughing fits feels not too bad; no fever/chills  SKIN: denies any unusual skin lesions or rashes  RESPIRATORY: + mild shortness of breath with exertion; see HPI  CARDIOVASCULAR: denies ches

## 2017-09-23 RX ORDER — CODEINE PHOSPHATE AND GUAIFENESIN 10; 100 MG/5ML; MG/5ML
10 SOLUTION ORAL EVERY 6 HOURS PRN
Qty: 600 ML | Refills: 0 | Status: SHIPPED | OUTPATIENT
Start: 2017-09-23 | End: 2017-10-03

## 2017-09-23 NOTE — TELEPHONE ENCOUNTER
From: Evelyn Zeng  Sent: 9/23/2017 11:00 AM CDT  Subject: Medication Renewal Request    Kathrin Hamilton.  Maricruz would like a refill of the following medications:     guaiFENesin-codeine (CHERATUSSIN AC) 100-10 MG/5ML Oral Solution Mey Solomon MD]    Prefer

## 2017-10-03 RX ORDER — CODEINE PHOSPHATE AND GUAIFENESIN 10; 100 MG/5ML; MG/5ML
10 SOLUTION ORAL EVERY 6 HOURS PRN
Qty: 600 ML | Refills: 0
Start: 2017-10-03 | End: 2017-10-03

## 2017-10-03 RX ORDER — CODEINE PHOSPHATE AND GUAIFENESIN 10; 100 MG/5ML; MG/5ML
10 SOLUTION ORAL EVERY 6 HOURS PRN
Qty: 600 ML | Refills: 0 | Status: SHIPPED
Start: 2017-10-03 | End: 2018-12-03 | Stop reason: ALTCHOICE

## 2017-10-03 RX ORDER — AMOXICILLIN AND CLAVULANATE POTASSIUM 875; 125 MG/1; MG/1
1 TABLET, FILM COATED ORAL 2 TIMES DAILY
Qty: 14 TABLET | Refills: 0 | Status: SHIPPED | OUTPATIENT
Start: 2017-10-03 | End: 2017-10-10

## 2017-10-03 NOTE — TELEPHONE ENCOUNTER
She may have a superimposed sinus infection now.  Let's treat with augmentin 875mg (script sent), cough syrup printed and if not improving by end of week give us a call

## 2017-10-03 NOTE — TELEPHONE ENCOUNTER
Patient states her cough is not getting better, it is getting worse. States she is passing out up to 3 times daily due to cough.   Her chest hurts to breath and she has been sleeping on her couch and she is sleeping on her cough because she can not make it

## 2017-10-03 NOTE — TELEPHONE ENCOUNTER
From: Seven Dumont  Sent: 10/3/2017 2:21 PM CDT  Subject: Medication Renewal Request    Nazanin Alcantara would like a refill of the following medications:     guaiFENesin-codeine (CHERATUSSIN AC) 100-10 MG/5ML Oral Solution Che Cali MD]    Preferr

## 2017-10-10 ENCOUNTER — HOSPITAL ENCOUNTER (OUTPATIENT)
Dept: GENERAL RADIOLOGY | Age: 32
Discharge: HOME | End: 2017-10-10
Attending: FAMILY MEDICINE
Payer: COMMERCIAL

## 2017-10-10 ENCOUNTER — OFFICE VISIT (OUTPATIENT)
Dept: FAMILY MEDICINE CLINIC | Facility: CLINIC | Age: 32
End: 2017-10-10

## 2017-10-10 VITALS
RESPIRATION RATE: 14 BRPM | DIASTOLIC BLOOD PRESSURE: 74 MMHG | TEMPERATURE: 97 F | HEART RATE: 84 BPM | WEIGHT: 254.63 LBS | BODY MASS INDEX: 42 KG/M2 | SYSTOLIC BLOOD PRESSURE: 110 MMHG

## 2017-10-10 DIAGNOSIS — S29.011A MUSCLE STRAIN OF ANTERIOR CHEST WALL: ICD-10-CM

## 2017-10-10 DIAGNOSIS — R07.81 RIB PAIN ON LEFT SIDE: Primary | ICD-10-CM

## 2017-10-10 DIAGNOSIS — R07.81 RIB PAIN ON LEFT SIDE: ICD-10-CM

## 2017-10-10 PROCEDURE — 71101 X-RAY EXAM UNILAT RIBS/CHEST: CPT | Performed by: FAMILY MEDICINE

## 2017-10-10 PROCEDURE — 99213 OFFICE O/P EST LOW 20 MIN: CPT | Performed by: FAMILY MEDICINE

## 2017-10-10 RX ORDER — HYDROCODONE BITARTRATE AND ACETAMINOPHEN 5; 325 MG/1; MG/1
1-2 TABLET ORAL EVERY 6 HOURS PRN
Qty: 60 TABLET | Refills: 0 | Status: SHIPPED | OUTPATIENT
Start: 2017-10-10 | End: 2017-10-26

## 2017-10-10 NOTE — PROGRESS NOTES
Dedra Alanis is a 27year old female. HPI:   PT is here concerned she may have broken a rib d/t her excessive and foreful coughing d/t whooping cough.     She reports 2 months in the cough is finally starting to let up and she did something outside of t Rfl:    cetirizine 10 MG Oral Tab Take 10 mg by mouth daily. Disp:  Rfl:    Cholecalciferol (VITAMIN D-3) 5000 UNITS Oral Tab Take 1 tablet by mouth.  Disp:  Rfl:    ferrous sulfate 325 (65 FE) MG Oral Tab EC Take 325 mg by mouth daily with breakfast. Disp: Used                      Comment: smokes occasionally  Alcohol use: Yes           0.0 oz/week     Comment: rare 3 x per year          REVIEW OF SYSTEMS:   GENERAL HEALTH: feels well otherwise  SKIN: denies any unusual skin lesions or rashes  RESPIRATORY:

## 2017-10-27 RX ORDER — HYDROCODONE BITARTRATE AND ACETAMINOPHEN 5; 325 MG/1; MG/1
1-2 TABLET ORAL NIGHTLY PRN
Qty: 30 TABLET | Refills: 0 | Status: SHIPPED | OUTPATIENT
Start: 2017-10-27 | End: 2017-11-10

## 2017-10-27 NOTE — TELEPHONE ENCOUNTER
Script printed, but if she hasn't tried stepping down to tylenol or advil to help her at night try that--want to get off norco as soon as possible

## 2017-10-27 NOTE — TELEPHONE ENCOUNTER
From: Swati Hernandez  Sent: 10/26/2017 9:27 PM CDT  Subject: Medication Renewal Request    Bertha Melara.  Maricruz would like a refill of the following medications:     HYDROcodone-acetaminophen ([address]) 5-325 MG Oral Tab Magdiel Marsh MD]    Preferred pharmacy:

## 2017-10-27 NOTE — TELEPHONE ENCOUNTER
Called and spoke to she states she will be picking up script. Instructed to bring DL. Script placed in book.

## 2017-10-27 NOTE — TELEPHONE ENCOUNTER
Patient states she stopped Norco on Friday and then was not coughing much throughout the weekend. Tuesday she states that she had a \"coughing fit\" and then right side is in pain.  She states she is wanting this more to be able to sleep, as her right side,

## 2017-12-01 RX ORDER — DEXTROAMPHETAMINE SACCHARATE, AMPHETAMINE ASPARTATE, DEXTROAMPHETAMINE SULFATE AND AMPHETAMINE SULFATE 7.5; 7.5; 7.5; 7.5 MG/1; MG/1; MG/1; MG/1
30 TABLET ORAL 2 TIMES DAILY
Qty: 60 TABLET | Refills: 0 | Status: SHIPPED | OUTPATIENT
Start: 2018-01-30 | End: 2018-02-23

## 2017-12-01 RX ORDER — DEXTROAMPHETAMINE SACCHARATE, AMPHETAMINE ASPARTATE, DEXTROAMPHETAMINE SULFATE AND AMPHETAMINE SULFATE 7.5; 7.5; 7.5; 7.5 MG/1; MG/1; MG/1; MG/1
30 TABLET ORAL 2 TIMES DAILY
Qty: 60 TABLET | Refills: 0 | Status: SHIPPED | OUTPATIENT
Start: 2017-12-31 | End: 2018-01-30

## 2017-12-01 RX ORDER — DEXTROAMPHETAMINE SACCHARATE, AMPHETAMINE ASPARTATE, DEXTROAMPHETAMINE SULFATE AND AMPHETAMINE SULFATE 7.5; 7.5; 7.5; 7.5 MG/1; MG/1; MG/1; MG/1
30 TABLET ORAL 2 TIMES DAILY
Qty: 60 TABLET | Refills: 0 | Status: SHIPPED | OUTPATIENT
Start: 2017-12-01 | End: 2017-12-01 | Stop reason: CLARIF

## 2017-12-01 RX ORDER — DEXTROAMPHETAMINE SACCHARATE, AMPHETAMINE ASPARTATE, DEXTROAMPHETAMINE SULFATE AND AMPHETAMINE SULFATE 7.5; 7.5; 7.5; 7.5 MG/1; MG/1; MG/1; MG/1
30 TABLET ORAL 2 TIMES DAILY
Qty: 60 TABLET | Refills: 0 | Status: SHIPPED | OUTPATIENT
Start: 2017-12-01 | End: 2018-02-23

## 2017-12-01 NOTE — TELEPHONE ENCOUNTER
From: Hailee Herring  Sent: 12/1/2017 10:22 AM CST  Subject: Medication Renewal Request    Shayy Best.  Maricruz would like a refill of the following medications:     amphetamine-dextroamphetamine (ADDERALL) 30 MG Oral Tab Ashli Minor MD]   Patient Comment:

## 2018-02-23 RX ORDER — DEXTROAMPHETAMINE SACCHARATE, AMPHETAMINE ASPARTATE, DEXTROAMPHETAMINE SULFATE AND AMPHETAMINE SULFATE 7.5; 7.5; 7.5; 7.5 MG/1; MG/1; MG/1; MG/1
30 TABLET ORAL 2 TIMES DAILY
Qty: 60 TABLET | Refills: 0 | Status: SHIPPED | OUTPATIENT
Start: 2018-02-23 | End: 2018-02-23

## 2018-02-23 RX ORDER — DEXTROAMPHETAMINE SACCHARATE, AMPHETAMINE ASPARTATE, DEXTROAMPHETAMINE SULFATE AND AMPHETAMINE SULFATE 7.5; 7.5; 7.5; 7.5 MG/1; MG/1; MG/1; MG/1
30 TABLET ORAL 2 TIMES DAILY
Qty: 60 TABLET | Refills: 0 | Status: SHIPPED | OUTPATIENT
Start: 2018-04-24 | End: 2018-05-07

## 2018-02-23 RX ORDER — DEXTROAMPHETAMINE SACCHARATE, AMPHETAMINE ASPARTATE, DEXTROAMPHETAMINE SULFATE AND AMPHETAMINE SULFATE 7.5; 7.5; 7.5; 7.5 MG/1; MG/1; MG/1; MG/1
30 TABLET ORAL 2 TIMES DAILY
Qty: 60 TABLET | Refills: 0 | Status: SHIPPED | OUTPATIENT
Start: 2018-03-25 | End: 2018-04-24

## 2018-02-23 NOTE — TELEPHONE ENCOUNTER
amphetamine-dextroamphetamine (ADDERALL) 30 MG Oral Tab 60 tablet 0 1/30/2018 3/1/2018    Sig - Route: Take 1 tablet (30 mg total) by mouth 2 (two) times daily. - Oral    Class: Print Script      Patient will pickup script when it is ready.

## 2018-02-23 NOTE — TELEPHONE ENCOUNTER
Pt advised that the script was ready she asks for 3 months- I advised that when she call for a refill to make sure she requests 3 months- she v/u  She will  the scirpt

## 2018-05-07 RX ORDER — DEXTROAMPHETAMINE SACCHARATE, AMPHETAMINE ASPARTATE, DEXTROAMPHETAMINE SULFATE AND AMPHETAMINE SULFATE 7.5; 7.5; 7.5; 7.5 MG/1; MG/1; MG/1; MG/1
30 TABLET ORAL 2 TIMES DAILY
Qty: 60 TABLET | Refills: 0 | Status: SHIPPED | OUTPATIENT
Start: 2018-07-06 | End: 2018-07-31

## 2018-05-07 RX ORDER — DEXTROAMPHETAMINE SACCHARATE, AMPHETAMINE ASPARTATE, DEXTROAMPHETAMINE SULFATE AND AMPHETAMINE SULFATE 7.5; 7.5; 7.5; 7.5 MG/1; MG/1; MG/1; MG/1
30 TABLET ORAL 2 TIMES DAILY
Qty: 60 TABLET | Refills: 0 | Status: SHIPPED | OUTPATIENT
Start: 2018-06-06 | End: 2018-07-06

## 2018-05-07 RX ORDER — DEXTROAMPHETAMINE SACCHARATE, AMPHETAMINE ASPARTATE, DEXTROAMPHETAMINE SULFATE AND AMPHETAMINE SULFATE 7.5; 7.5; 7.5; 7.5 MG/1; MG/1; MG/1; MG/1
30 TABLET ORAL 2 TIMES DAILY
Qty: 60 TABLET | Refills: 0 | Status: CANCELLED | OUTPATIENT
Start: 2018-05-07 | End: 2018-06-06

## 2018-05-07 RX ORDER — DEXTROAMPHETAMINE SACCHARATE, AMPHETAMINE ASPARTATE, DEXTROAMPHETAMINE SULFATE AND AMPHETAMINE SULFATE 7.5; 7.5; 7.5; 7.5 MG/1; MG/1; MG/1; MG/1
30 TABLET ORAL 2 TIMES DAILY
Qty: 60 TABLET | Refills: 0 | Status: SHIPPED | OUTPATIENT
Start: 2018-05-07 | End: 2018-06-06

## 2018-05-07 NOTE — TELEPHONE ENCOUNTER
Pt needs refill of Adderall  - rqstd 3 month script.  Pt's insurance was switched to [facility] program eff 5-1-18

## 2018-05-07 NOTE — TELEPHONE ENCOUNTER
Left a message for the pt that her scripts are ready for  and to make sure she establishes with her new physician ASAP

## 2018-05-07 NOTE — TELEPHONE ENCOUNTER
90 day printed out, best wishes to pt with her new doc and please reiterate with her not to put off appointment with new doc until last minute before running out of meds, schedule now

## 2018-05-07 NOTE — TELEPHONE ENCOUNTER
Pt returned nurses phone call. Let pt know her script was ready for  and advised her that she will need switch her pcp. Pt said she is staying with [facility] and is changing her insurance.

## 2018-07-31 RX ORDER — DEXTROAMPHETAMINE SACCHARATE, AMPHETAMINE ASPARTATE, DEXTROAMPHETAMINE SULFATE AND AMPHETAMINE SULFATE 7.5; 7.5; 7.5; 7.5 MG/1; MG/1; MG/1; MG/1
30 TABLET ORAL 2 TIMES DAILY
Qty: 60 TABLET | Refills: 0 | Status: SHIPPED | OUTPATIENT
Start: 2018-07-31 | End: 2018-09-06

## 2018-07-31 NOTE — TELEPHONE ENCOUNTER
LRF 7/6/18  LOV  10/10/17 acute    No future appointments.       I only pended 1 script for the pt- she has not been seen for 10 months-she needs a follow up on her adderall

## 2018-08-01 NOTE — TELEPHONE ENCOUNTER
Left message for the pt that there is one script ready for her and she is due for a follow up with Dr. Lyly Rodriguez

## 2018-09-06 RX ORDER — DEXTROAMPHETAMINE SACCHARATE, AMPHETAMINE ASPARTATE, DEXTROAMPHETAMINE SULFATE AND AMPHETAMINE SULFATE 7.5; 7.5; 7.5; 7.5 MG/1; MG/1; MG/1; MG/1
30 TABLET ORAL 2 TIMES DAILY
Qty: 60 TABLET | Refills: 0 | Status: SHIPPED | OUTPATIENT
Start: 2018-09-06 | End: 2018-10-04

## 2018-09-06 NOTE — TELEPHONE ENCOUNTER
Last refilled on 7/31/18 for # 60 with 0 refills  Last seen on 10/10/17  Future Appointments  Date Time Provider Ольга Cheng   9/17/2018 1:00 PM Yevette Severin, MD [facility]        Thank you.

## 2018-09-24 ENCOUNTER — PATIENT OUTREACH (OUTPATIENT)
Dept: FAMILY MEDICINE CLINIC | Facility: CLINIC | Age: 33
End: 2018-09-24

## 2018-10-04 RX ORDER — DEXTROAMPHETAMINE SACCHARATE, AMPHETAMINE ASPARTATE, DEXTROAMPHETAMINE SULFATE AND AMPHETAMINE SULFATE 7.5; 7.5; 7.5; 7.5 MG/1; MG/1; MG/1; MG/1
30 TABLET ORAL 2 TIMES DAILY
Qty: 60 TABLET | Refills: 0 | Status: SHIPPED | OUTPATIENT
Start: 2018-10-04 | End: 2018-11-02

## 2018-10-04 RX ORDER — ALBUTEROL SULFATE 90 UG/1
2 AEROSOL, METERED RESPIRATORY (INHALATION) EVERY 4 HOURS PRN
Qty: 8.5 G | Refills: 0 | Status: SHIPPED | OUTPATIENT
Start: 2018-10-04

## 2018-10-04 NOTE — TELEPHONE ENCOUNTER
Albuterol Last refilled on 8/28/18 for # 8.5g with 0 refills  adderall refilled 9/6/18 #60 with 0 refills  Last seen on 10/10/17  Future Appointments   Date Time Provider Ольга Cheng   10/12/2018  4:00 PM Nico Waldron MD [facility]        T

## 2018-11-01 NOTE — TELEPHONE ENCOUNTER
Last refilled on 10/4/17 for # 60 with 0 refills  Last seen on 10/10/17  No future appointments. Thank you.

## 2018-11-02 ENCOUNTER — TELEPHONE (OUTPATIENT)
Dept: FAMILY MEDICINE CLINIC | Facility: CLINIC | Age: 33
End: 2018-11-02

## 2018-11-02 RX ORDER — DEXTROAMPHETAMINE SACCHARATE, AMPHETAMINE ASPARTATE, DEXTROAMPHETAMINE SULFATE AND AMPHETAMINE SULFATE 7.5; 7.5; 7.5; 7.5 MG/1; MG/1; MG/1; MG/1
30 TABLET ORAL 2 TIMES DAILY
Qty: 14 TABLET | Refills: 0 | Status: SHIPPED | OUTPATIENT
Start: 2018-11-02 | End: 2018-11-09

## 2018-11-02 RX ORDER — DEXTROAMPHETAMINE SACCHARATE, AMPHETAMINE ASPARTATE, DEXTROAMPHETAMINE SULFATE AND AMPHETAMINE SULFATE 7.5; 7.5; 7.5; 7.5 MG/1; MG/1; MG/1; MG/1
30 TABLET ORAL 2 TIMES DAILY
Qty: 60 TABLET | Refills: 0 | OUTPATIENT
Start: 2018-11-02 | End: 2018-12-02

## 2018-11-02 NOTE — TELEPHONE ENCOUNTER
Last refilled on 10/4/18 for # 60 with 0 refills  Last seen on 10/10/17  No future appointments. Thank you.

## 2018-11-02 NOTE — TELEPHONE ENCOUNTER
See rx request from yesterday that was denied.  Patient was previously notifeid needs office visit (had scheduled for eaerlier this fall but canceled)

## 2018-11-02 NOTE — TELEPHONE ENCOUNTER
Called and spoke to patient, she states she will come over now.  Patient notified that we are here until 5 pm.

## 2018-11-02 NOTE — TELEPHONE ENCOUNTER
Patient was previously notifeid needs office visit (had scheduled for eaerlier this fall but canceled)

## 2018-11-02 NOTE — TELEPHONE ENCOUNTER
LRF 10/4/18  LOV  10/10/17    I spoke with the pt and advised that we can not fill this medication until she is seen in the office and she scheduled an appt for next week  The pt asks if we can give her enough pills to get her through to her appt  Advised

## 2018-11-02 NOTE — TELEPHONE ENCOUNTER
See tel enc from 11/2/18    Future Appointments   Date Time Provider Ольга Cheng   11/9/2018  3:15 PM Lorraine Gaytan MD [facility] [address]

## 2018-11-02 NOTE — TELEPHONE ENCOUNTER
amphetamine-dextroamphetamine (ADDERALL) 30 MG Oral Tab 60 tablet 0 10/4/2018 11/3/2018   Sig :  Take 1 tablet (30 mg total) by mouth 2 (two) times daily.      Route:   Oral       Patient will  script

## 2018-11-09 NOTE — PROGRESS NOTES
Anika Cortes is a 26year old female. HPI:   Pt is here to f/u on ADHD. Meds prescribed:   Current Outpatient Medications:   •  Albuterol Sulfate HFA (PROAIR HFA) 108 (90 Base) MCG/ACT Inhalation Aero Soln, Inhale 1 puff into the lungs. 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

## 2018-11-12 ENCOUNTER — TELEPHONE (OUTPATIENT)
Dept: FAMILY MEDICINE CLINIC | Facility: CLINIC | Age: 33
End: 2018-11-12

## 2018-11-12 NOTE — TELEPHONE ENCOUNTER
Spoke with the pt and advised that her note is ready- she v/u she will come this evening to pick it up

## 2018-11-12 NOTE — TELEPHONE ENCOUNTER
Patient was sick and missed work on Saturday. She needs a note for work. Please call patient when note is ready to be picked up.

## 2018-11-12 NOTE — TELEPHONE ENCOUNTER
Spoke with the pt - she states that she has a head cold- she states that she feels like her head is in cement. The pt states that she was in the office on Friday and discussed this with Dr. Mary Jordan when she was here.     Took Wednesday and Sat off and she stat

## 2018-11-13 ENCOUNTER — TELEPHONE (OUTPATIENT)
Dept: FAMILY MEDICINE CLINIC | Facility: CLINIC | Age: 33
End: 2018-11-13

## 2018-11-13 RX ORDER — DEXTROAMPHETAMINE SACCHARATE, AMPHETAMINE ASPARTATE, DEXTROAMPHETAMINE SULFATE AND AMPHETAMINE SULFATE 7.5; 7.5; 7.5; 7.5 MG/1; MG/1; MG/1; MG/1
30 TABLET ORAL 2 TIMES DAILY
Qty: 60 TABLET | Refills: 0 | Status: SHIPPED | OUTPATIENT
Start: 2019-01-12 | End: 2018-11-14

## 2018-11-13 NOTE — TELEPHONE ENCOUNTER
PT CALLED AND ADV THAT MEDS WERE SWITCHED UP    ADV PHARMACY DOES NOT HAVE MEDS AND WOULD LIKE TO GO BACK TO NORMAL MEDS    PLEASE CALL AND ADV     THANK YOU

## 2018-11-13 NOTE — TELEPHONE ENCOUNTER
Spoke with the pt and clarified  What she is requesting    The pt states that she and Dr. Gail Avendaño were changing her adderall to the short acting in the am and the extended release in the PM  The pharmacy had to order the extended release and they will not hav

## 2018-11-14 RX ORDER — DEXTROAMPHETAMINE SACCHARATE, AMPHETAMINE ASPARTATE, DEXTROAMPHETAMINE SULFATE AND AMPHETAMINE SULFATE 7.5; 7.5; 7.5; 7.5 MG/1; MG/1; MG/1; MG/1
30 TABLET ORAL 2 TIMES DAILY
Qty: 60 TABLET | Refills: 0 | Status: SHIPPED | OUTPATIENT
Start: 2018-11-14 | End: 2018-12-11

## 2018-11-14 NOTE — TELEPHONE ENCOUNTER
Left message for the pt with the notes from Dr. Edwige Buck asked her to call me to let me know if the pharmacy will hold on to the scripts from this month until next month.

## 2018-11-30 ENCOUNTER — TELEPHONE (OUTPATIENT)
Dept: FAMILY MEDICINE CLINIC | Facility: CLINIC | Age: 33
End: 2018-11-30

## 2018-11-30 NOTE — TELEPHONE ENCOUNTER
Spoke with the pt and she  Thinks she has the3 flu      Chills, body aches, fever no actual number, vomiting, she has a headache- started Sunday    OTC - dayquill and nyquill    She is starting to feel better- she is looking for a note to excuse her from w

## 2018-11-30 NOTE — TELEPHONE ENCOUNTER
Pt called, has had the flu all week and has not been able to work. Pt needs a note to go back to work.   Please call pt at [phone]

## 2018-11-30 NOTE — TELEPHONE ENCOUNTER
Spoke with the pt and advised that she needs to be seen- she can not make the appt for tomorrow so we scheduled for Monday  Future Appointments   Date Time Provider Ольга Cheng   12/3/2018 10:00 AM Madeleine Newell MD [facility] MARY LOU Menendez

## 2018-12-03 ENCOUNTER — OFFICE VISIT (OUTPATIENT)
Dept: FAMILY MEDICINE CLINIC | Facility: CLINIC | Age: 33
End: 2018-12-03

## 2018-12-03 VITALS
TEMPERATURE: 99 F | DIASTOLIC BLOOD PRESSURE: 78 MMHG | SYSTOLIC BLOOD PRESSURE: 126 MMHG | HEART RATE: 112 BPM | OXYGEN SATURATION: 96 % | BODY MASS INDEX: 43 KG/M2 | RESPIRATION RATE: 18 BRPM | WEIGHT: 256 LBS

## 2018-12-03 DIAGNOSIS — F43.9 STRESS: ICD-10-CM

## 2018-12-03 DIAGNOSIS — R10.13 EPIGASTRIC PAIN: Primary | ICD-10-CM

## 2018-12-03 PROCEDURE — 99214 OFFICE O/P EST MOD 30 MIN: CPT | Performed by: FAMILY MEDICINE

## 2018-12-03 RX ORDER — CLONAZEPAM 0.5 MG/1
TABLET ORAL
Qty: 30 TABLET | Refills: 0 | Status: SHIPPED
Start: 2018-12-03 | End: 2020-03-19

## 2018-12-03 RX ORDER — BUDESONIDE AND FORMOTEROL FUMARATE DIHYDRATE 160; 4.5 UG/1; UG/1
2 AEROSOL RESPIRATORY (INHALATION) 2 TIMES DAILY
Qty: 1 INHALER | Refills: 11 | Status: SHIPPED | OUTPATIENT
Start: 2018-12-03 | End: 2020-11-08

## 2018-12-03 NOTE — PROGRESS NOTES
HPI:   Willis Zacarias is a 26year old female who presents for upper respiratory symptoms for 7 days.     Started with: Gi issues, upset stomach, some discomfort under R ribcage, sometimes comes in waves of \"bad\" pain almost going to the hospital but magdy Supplies (NEBULIZER) Does not apply Device Use as directed Disp: 1 Device Rfl: 0   Fluticasone Propionate 50 MCG/ACT Nasal Suspension 2 sprays by Each Nare route daily. Disp:  Rfl:    cetirizine 10 MG Oral Tab Take 10 mg by mouth daily.  Disp:  Rfl:    Chol GENERAL: well developed, well nourished,in no apparent distress; sounds congested  SKIN: no rashes,no suspicious lesions  EYES: EOMI, conjunctiva are clear, no drainage  HEENT: atraumatic, normocephalic,TMs normal; + scant PND; turbinates mildly swollen

## 2018-12-11 NOTE — TELEPHONE ENCOUNTER
befor I fill this, please get an update--we were going to do 1 short aacting in the morning and an XL in the afternoon last month, but pharmacy had to order the XL so we did the short acting BID in the meantime. Did she end up picking up the XL?  How has s

## 2018-12-12 RX ORDER — DEXTROAMPHETAMINE SACCHARATE, AMPHETAMINE ASPARTATE, DEXTROAMPHETAMINE SULFATE AND AMPHETAMINE SULFATE 7.5; 7.5; 7.5; 7.5 MG/1; MG/1; MG/1; MG/1
30 TABLET ORAL 2 TIMES DAILY
Qty: 60 TABLET | Refills: 0 | Status: SHIPPED | OUTPATIENT
Start: 2018-12-12 | End: 2019-01-09

## 2018-12-12 NOTE — TELEPHONE ENCOUNTER
Patient states that she did not fill XL script. Wanted to start doing it in January since she will be off work.  States that the script is still at [facility] in [facility]

## 2019-01-02 ENCOUNTER — PATIENT OUTREACH (OUTPATIENT)
Dept: FAMILY MEDICINE CLINIC | Facility: CLINIC | Age: 34
End: 2019-01-02

## 2019-01-09 ENCOUNTER — TELEPHONE (OUTPATIENT)
Dept: FAMILY MEDICINE CLINIC | Facility: CLINIC | Age: 34
End: 2019-01-09

## 2019-01-09 RX ORDER — DEXTROAMPHETAMINE SACCHARATE, AMPHETAMINE ASPARTATE, DEXTROAMPHETAMINE SULFATE AND AMPHETAMINE SULFATE 7.5; 7.5; 7.5; 7.5 MG/1; MG/1; MG/1; MG/1
30 TABLET ORAL 2 TIMES DAILY
Qty: 60 TABLET | Refills: 0 | Status: SHIPPED | OUTPATIENT
Start: 2019-01-09 | End: 2019-02-04

## 2019-02-04 RX ORDER — DEXTROAMPHETAMINE SACCHARATE, AMPHETAMINE ASPARTATE, DEXTROAMPHETAMINE SULFATE AND AMPHETAMINE SULFATE 7.5; 7.5; 7.5; 7.5 MG/1; MG/1; MG/1; MG/1
30 TABLET ORAL 2 TIMES DAILY
Qty: 60 TABLET | Refills: 0 | Status: SHIPPED | OUTPATIENT
Start: 2019-02-04 | End: 2019-04-24

## 2019-02-04 RX ORDER — DEXTROAMPHETAMINE SACCHARATE, AMPHETAMINE ASPARTATE, DEXTROAMPHETAMINE SULFATE AND AMPHETAMINE SULFATE 7.5; 7.5; 7.5; 7.5 MG/1; MG/1; MG/1; MG/1
30 TABLET ORAL 2 TIMES DAILY
Qty: 60 TABLET | Refills: 0 | Status: SHIPPED | OUTPATIENT
Start: 2019-04-05 | End: 2019-04-24

## 2019-02-04 RX ORDER — DEXTROAMPHETAMINE SACCHARATE, AMPHETAMINE ASPARTATE, DEXTROAMPHETAMINE SULFATE AND AMPHETAMINE SULFATE 7.5; 7.5; 7.5; 7.5 MG/1; MG/1; MG/1; MG/1
30 TABLET ORAL 2 TIMES DAILY
Qty: 60 TABLET | Refills: 0 | Status: SHIPPED | OUTPATIENT
Start: 2019-03-06 | End: 2019-04-24

## 2019-02-04 NOTE — TELEPHONE ENCOUNTER
Pt called, needs refill on amphetamine-dextroamphetamine (ADDERALL) 30 MG Oral Tab, 3 months supply. Pt will  the script(s).    Please call pt at [phone]

## 2019-04-24 RX ORDER — DEXTROAMPHETAMINE SACCHARATE, AMPHETAMINE ASPARTATE, DEXTROAMPHETAMINE SULFATE AND AMPHETAMINE SULFATE 7.5; 7.5; 7.5; 7.5 MG/1; MG/1; MG/1; MG/1
30 TABLET ORAL 2 TIMES DAILY
Qty: 60 TABLET | Refills: 0 | Status: SHIPPED | OUTPATIENT
Start: 2019-05-24 | End: 2019-05-15

## 2019-04-24 RX ORDER — DEXTROAMPHETAMINE SACCHARATE, AMPHETAMINE ASPARTATE, DEXTROAMPHETAMINE SULFATE AND AMPHETAMINE SULFATE 7.5; 7.5; 7.5; 7.5 MG/1; MG/1; MG/1; MG/1
30 TABLET ORAL 2 TIMES DAILY
Qty: 60 TABLET | Refills: 0 | Status: SHIPPED | OUTPATIENT
Start: 2019-04-24 | End: 2019-05-15

## 2019-04-24 NOTE — TELEPHONE ENCOUNTER
LRF 4/5/19  LOV,  ADHD follow up 11/9/18  No future appointment  I pended the order for 2 scripts- needs follow up prior to more refills

## 2019-04-24 NOTE — TELEPHONE ENCOUNTER
Called pt advised script for 2 mo. is ready for . And will need to make appointment prior to more refills.

## 2019-04-24 NOTE — TELEPHONE ENCOUNTER
PT CALLED AND ADV NEEDS 3 MONTH REFILL OF     amphetamine-dextroamphetamine (ADDERALL) 30 MG Oral Tab    PLEASE CALL WHEN READY FOR P/U     THANK YOU

## 2019-05-15 RX ORDER — DEXTROAMPHETAMINE SACCHARATE, AMPHETAMINE ASPARTATE, DEXTROAMPHETAMINE SULFATE AND AMPHETAMINE SULFATE 7.5; 7.5; 7.5; 7.5 MG/1; MG/1; MG/1; MG/1
30 TABLET ORAL 2 TIMES DAILY
Qty: 60 TABLET | Refills: 0 | Status: SHIPPED | OUTPATIENT
Start: 2019-05-15 | End: 2019-06-28

## 2019-05-15 NOTE — TELEPHONE ENCOUNTER
Pt called, needs refill for amphetamine-dextroamphetamine (ADDERALL) 30 MG Oral Tab that has a date of 5/22. Please call pt at [phone]. Pt will  script. Pt is going out of town this coming weekend.

## 2019-05-15 NOTE — TELEPHONE ENCOUNTER
Last refilled on 4/24/19 for # 60 with 0 refills  Last OV 12/3/18  No future appointments. Thank you.

## 2019-06-28 RX ORDER — DEXTROAMPHETAMINE SACCHARATE, AMPHETAMINE ASPARTATE, DEXTROAMPHETAMINE SULFATE AND AMPHETAMINE SULFATE 7.5; 7.5; 7.5; 7.5 MG/1; MG/1; MG/1; MG/1
30 TABLET ORAL 2 TIMES DAILY
Qty: 60 TABLET | Refills: 0 | Status: SHIPPED | OUTPATIENT
Start: 2019-06-28 | End: 2019-07-22

## 2019-06-28 NOTE — TELEPHONE ENCOUNTER
Pt says she needs a refill of her adderall. Say she has enough to get by the weekend, but not enough to get her by till next Friday.

## 2019-06-28 NOTE — TELEPHONE ENCOUNTER
Last refill: 5/24/2019 #60 with 0 reiflls  Last Visit: 12/03/2018   Next Visit:   Future Appointments   Date Time Provider Ольга Cheng   7/22/2019  8:45 AM Nichole Arrington MD [facility] [place]         Forward to Dr. Venice Felix please advise on refills.  Manjeet Duenas

## 2019-06-28 NOTE — TELEPHONE ENCOUNTER
Left message on voicemail/answering machine for patient to call office      Script placed in  folder.

## 2019-07-22 PROBLEM — J30.9 ALLERGIC RHINITIS DUE TO ALLERGEN: Status: ACTIVE | Noted: 2019-07-22

## 2019-07-22 PROBLEM — G43.009 MIGRAINE WITHOUT AURA AND WITHOUT STATUS MIGRAINOSUS, NOT INTRACTABLE: Status: ACTIVE | Noted: 2019-07-22

## 2019-07-22 NOTE — PROGRESS NOTES
Meaghan Stanley is a 37year old female. HPI:   Pt is here to f/u on ADHD.     Meds prescribed:   Current Outpatient Medications:   •  Butalbital-APAP-Caffeine (FIORICET) -40 MG Oral Cap, Take 1 capsule by mouth every 4 (four) hours as needed for Kalia Bouillon anything and just vegging.       Med Side effects: none bothersome    Med Affordability: not a problem    Home/school/work functioning: does really well on it, improves her quality of life, functions so much better on it, even with errands like going to the [address]        melanoma   • Other (Other[other]) Other         kidney transplant (alports)      Social History    Tobacco Use      Smoking status: Current Some Day Smoker        Packs/day: 0.25        Years: 16.00        Pack years: 4        Types: Cigar tablet (30 mg total) by mouth 2 (two) times daily. -     amphetamine-dextroamphetamine (ADDERALL) 30 MG Oral Tab; Take 1 tablet (30 mg total) by mouth 2 (two) times daily. -     amphetamine-dextroamphetamine (ADDERALL) 30 MG Oral Tab;  Take 1 tablet (30 m

## 2019-07-23 ENCOUNTER — TELEPHONE (OUTPATIENT)
Dept: FAMILY MEDICINE CLINIC | Facility: CLINIC | Age: 34
End: 2019-07-23

## 2019-07-23 DIAGNOSIS — R31.29 HEMATURIA, MICROSCOPIC: ICD-10-CM

## 2019-07-23 DIAGNOSIS — R80.1 PERSISTENT PROTEINURIA: Primary | ICD-10-CM

## 2019-07-23 NOTE — TELEPHONE ENCOUNTER
Pt dropped off some forms that needed to be filled out to advise that the pt is safe to operate a commercial vehicle- she is on adderall and fioricet  There is a request for a note that the pt is safe to drive  With proteinuria and hematuria.     I called t

## 2019-07-23 NOTE — TELEPHONE ENCOUNTER
Spoke with the pt and advised of the letter and form  She v/u  I asked her about her period during the urinalysis  She states that she did not  Advised that Dr. Evert Clay is recommending a send out ua- the pt states that she doesn't have insurance right now but

## 2019-07-23 NOTE — TELEPHONE ENCOUNTER
Note printed, thanks  Was she on her period at time of u/a?  If not, would recommend f/u u/a (send out w/micro) here

## 2019-10-04 RX ORDER — BUTALBITAL, ACETAMINOPHEN AND CAFFEINE 300; 40; 50 MG/1; MG/1; MG/1
CAPSULE ORAL
Qty: 5 CAPSULE | Refills: 0 | Status: SHIPPED | OUTPATIENT
Start: 2019-10-04 | End: 2020-04-30

## 2019-10-09 ENCOUNTER — TELEPHONE (OUTPATIENT)
Dept: FAMILY MEDICINE CLINIC | Facility: CLINIC | Age: 34
End: 2019-10-09

## 2019-10-09 RX ORDER — DEXTROAMPHETAMINE SACCHARATE, AMPHETAMINE ASPARTATE, DEXTROAMPHETAMINE SULFATE AND AMPHETAMINE SULFATE 7.5; 7.5; 7.5; 7.5 MG/1; MG/1; MG/1; MG/1
30 TABLET ORAL 2 TIMES DAILY
Qty: 60 TABLET | Refills: 0 | Status: SHIPPED | OUTPATIENT
Start: 2019-10-18 | End: 2019-11-15

## 2019-10-09 NOTE — TELEPHONE ENCOUNTER
LRF 9/20/19  LOV  7/22/19    Spoke with the pt and asked if she still has medication for about 10 days  She states that she will forget if she doesn't call now

## 2019-10-09 NOTE — TELEPHONE ENCOUNTER
Pt needs a refill of the  amphetamine-dextroamphetamine (ADDERALL) 30 MG Oral Tab  Pharmacy [place] in [facility]

## 2019-10-30 ENCOUNTER — TELEPHONE (OUTPATIENT)
Dept: FAMILY MEDICINE CLINIC | Facility: CLINIC | Age: 34
End: 2019-10-30

## 2019-11-15 ENCOUNTER — TELEPHONE (OUTPATIENT)
Dept: FAMILY MEDICINE CLINIC | Facility: CLINIC | Age: 34
End: 2019-11-15

## 2019-11-15 RX ORDER — DEXTROAMPHETAMINE SACCHARATE, AMPHETAMINE ASPARTATE, DEXTROAMPHETAMINE SULFATE AND AMPHETAMINE SULFATE 7.5; 7.5; 7.5; 7.5 MG/1; MG/1; MG/1; MG/1
30 TABLET ORAL 2 TIMES DAILY
Qty: 60 TABLET | Refills: 0 | Status: SHIPPED | OUTPATIENT
Start: 2019-12-16 | End: 2020-01-06 | Stop reason: ALTCHOICE

## 2019-11-15 RX ORDER — DEXTROAMPHETAMINE SACCHARATE, AMPHETAMINE ASPARTATE, DEXTROAMPHETAMINE SULFATE AND AMPHETAMINE SULFATE 7.5; 7.5; 7.5; 7.5 MG/1; MG/1; MG/1; MG/1
30 TABLET ORAL 2 TIMES DAILY
Qty: 60 TABLET | Refills: 0 | Status: SHIPPED | OUTPATIENT
Start: 2020-01-16 | End: 2020-01-06

## 2019-11-15 RX ORDER — DEXTROAMPHETAMINE SACCHARATE, AMPHETAMINE ASPARTATE, DEXTROAMPHETAMINE SULFATE AND AMPHETAMINE SULFATE 7.5; 7.5; 7.5; 7.5 MG/1; MG/1; MG/1; MG/1
30 TABLET ORAL 2 TIMES DAILY
Qty: 60 TABLET | Refills: 0 | Status: SHIPPED | OUTPATIENT
Start: 2019-11-15 | End: 2019-12-15

## 2019-11-16 NOTE — TELEPHONE ENCOUNTER
Pt advised that there was 3 months of scripts sent to the pharmcy and she is due for follow up in Jan-  She actually already called this AM and scheduled  Future Appointments   Date Time Provider Ольга Cheng   1/6/2020  9:30 AM Noe Medina MD [facility]

## 2020-01-06 PROCEDURE — 80061 LIPID PANEL: CPT | Performed by: FAMILY MEDICINE

## 2020-01-06 PROCEDURE — 82306 VITAMIN D 25 HYDROXY: CPT | Performed by: FAMILY MEDICINE

## 2020-01-06 PROCEDURE — 83036 HEMOGLOBIN GLYCOSYLATED A1C: CPT | Performed by: FAMILY MEDICINE

## 2020-01-06 PROCEDURE — 80050 GENERAL HEALTH PANEL: CPT | Performed by: FAMILY MEDICINE

## 2020-01-06 PROCEDURE — 36415 COLL VENOUS BLD VENIPUNCTURE: CPT | Performed by: FAMILY MEDICINE

## 2020-01-06 PROCEDURE — 84480 ASSAY TRIIODOTHYRONINE (T3): CPT | Performed by: FAMILY MEDICINE

## 2020-01-06 PROCEDURE — 84439 ASSAY OF FREE THYROXINE: CPT | Performed by: FAMILY MEDICINE

## 2020-01-06 NOTE — PROGRESS NOTES
Deon Estrada is a 37year old female. HPI:   Pt is here to f/u on ADHD.     Meds prescribed:   Current Outpatient Medications:   •  [START ON 2/6/2020] Amphetamine-Dextroamphet ER (ADDERALL XR) 30 MG Oral Capsule SR 24 Hr, Take 1 capsule (30 mg total) b adderall works well for 4:30am to about 3:30pm, but after that she's \"not there. \"  Everything is a struggle.     Med Affordability: not a problem    Non-medicinal strategies in place: yes, to do list,s white boards    Home/school/work functioning: good, b overall doing well, and uses albuterol rarely (a few times this month with uri)  CARDIOVASCULAR: denies chest pain on exertion  GI: denies abdominal pain/n/v  PSYCH: see HPI  NEURO: denies  New or unusual headaches    EXAM:   /70   Pulse 78   Temp 97 LIPID PANEL; Future  -     ASSAY, THYROID STIM HORMONE; Future  -     VITAMIN D, 25-HYDROXY; Future  -     TRIIODOTHYRONINE (T3) TOTAL; Future  -     FREE T4 (FREE THYROXINE);  Future  -     HEMOGLOBIN A1C; Future    Weight loss counseling, encounter for  - plan.  The patient is asked to return q6 months and pending results. Tobacco Cessation Documentation (Smoking and Smokeless included):    I had an in depth therapy session with Carlos Crespo about her tobacco use risks and options using the USPSTF's

## 2020-01-07 ENCOUNTER — NURSE ONLY (OUTPATIENT)
Dept: FAMILY MEDICINE CLINIC | Facility: CLINIC | Age: 35
End: 2020-01-07
Payer: COMMERCIAL

## 2020-01-07 PROCEDURE — 90715 TDAP VACCINE 7 YRS/> IM: CPT | Performed by: FAMILY MEDICINE

## 2020-01-07 PROCEDURE — 90471 IMMUNIZATION ADMIN: CPT | Performed by: FAMILY MEDICINE

## 2020-03-07 RX ORDER — DEXTROAMPHETAMINE SACCHARATE, AMPHETAMINE ASPARTATE, DEXTROAMPHETAMINE SULFATE AND AMPHETAMINE SULFATE 7.5; 7.5; 7.5; 7.5 MG/1; MG/1; MG/1; MG/1
30 TABLET ORAL EVERY MORNING
Qty: 30 TABLET | Refills: 0 | Status: SHIPPED | OUTPATIENT
Start: 2020-03-07 | End: 2020-04-01

## 2020-03-07 RX ORDER — DEXTROAMPHETAMINE SACCHARATE, AMPHETAMINE ASPARTATE MONOHYDRATE, DEXTROAMPHETAMINE SULFATE AND AMPHETAMINE SULFATE 7.5; 7.5; 7.5; 7.5 MG/1; MG/1; MG/1; MG/1
30 CAPSULE, EXTENDED RELEASE ORAL EVERY MORNING
Qty: 30 CAPSULE | Refills: 0 | Status: SHIPPED | OUTPATIENT
Start: 2020-03-07 | End: 2020-04-01

## 2020-03-07 NOTE — TELEPHONE ENCOUNTER
Pt called, needs refills on Amphetamine-Dextroamphet ER (ADDERALL XR) 30 MG Oral Capsule SR 24 Hr andamphetamine-dextroamphetamine (ADDERALL) 30 MG Oral Tab. [facility]. Please call pt at [phone]. Pt still has some pills.   Pt aware D

## 2020-03-07 NOTE — TELEPHONE ENCOUNTER
Routing to provider per protocol. Adderall XR 30 mg last refilled on 2/6/20 for # 30 with 0 rf. Adderall 30 mg last refilled on 1/6/20 for # 30 with 0 rf. Last seen on 1/6/20. No future appointments. Thank you.

## 2020-03-19 RX ORDER — CLONAZEPAM 0.5 MG/1
TABLET ORAL
Qty: 30 TABLET | Refills: 0 | Status: SHIPPED | OUTPATIENT
Start: 2020-03-19 | End: 2020-04-30

## 2020-03-19 RX ORDER — CLONAZEPAM 0.5 MG/1
TABLET ORAL
Qty: 30 TABLET | Refills: 0 | Status: SHIPPED
Start: 2020-03-19 | End: 2020-03-19

## 2020-03-19 NOTE — TELEPHONE ENCOUNTER
Last refilled on 12/3/18 for # 30 with 0 refills  Last OV 1/6/20 for ADD  No future appointments. Thank you.

## 2020-04-01 RX ORDER — DEXTROAMPHETAMINE SACCHARATE, AMPHETAMINE ASPARTATE MONOHYDRATE, DEXTROAMPHETAMINE SULFATE AND AMPHETAMINE SULFATE 7.5; 7.5; 7.5; 7.5 MG/1; MG/1; MG/1; MG/1
30 CAPSULE, EXTENDED RELEASE ORAL EVERY MORNING
Qty: 30 CAPSULE | Refills: 0 | Status: SHIPPED | OUTPATIENT
Start: 2020-04-01 | End: 2020-05-01

## 2020-04-01 RX ORDER — DEXTROAMPHETAMINE SACCHARATE, AMPHETAMINE ASPARTATE, DEXTROAMPHETAMINE SULFATE AND AMPHETAMINE SULFATE 7.5; 7.5; 7.5; 7.5 MG/1; MG/1; MG/1; MG/1
30 TABLET ORAL EVERY MORNING
Qty: 30 TABLET | Refills: 0 | Status: SHIPPED | OUTPATIENT
Start: 2020-04-01 | End: 2020-12-02

## 2020-04-01 NOTE — TELEPHONE ENCOUNTER
Medication Quantity Refills Start End   Amphetamine-Dextroamphet ER (ADDERALL XR) 30 MG Oral Capsule SR 24 Hr 30 capsule 0 3/7/2020 4/6/2020     Medication Quantity Refills Start End   amphetamine-dextroamphetamine (ADDERALL) 30 MG Oral Tab 30 tablet 0 3/7

## 2020-04-01 NOTE — TELEPHONE ENCOUNTER
Pt needs a refill of the  amphetamine-dextroamphetamine (ADDERALL) 30 MG Oral Tab  And  Amphetamine-Dextroamphet ER (ADDERALL XR) 30 MG Oral Capsule SR 24 Hr    [place] in [place]

## 2020-04-30 RX ORDER — CLONAZEPAM 0.5 MG/1
TABLET ORAL
Qty: 30 TABLET | Refills: 0 | Status: SHIPPED | OUTPATIENT
Start: 2020-04-30 | End: 2020-07-01

## 2020-04-30 RX ORDER — BUTALBITAL, ACETAMINOPHEN AND CAFFEINE 300; 40; 50 MG/1; MG/1; MG/1
CAPSULE ORAL
Qty: 20 CAPSULE | Refills: 0 | Status: SHIPPED | OUTPATIENT
Start: 2020-04-30 | End: 2021-03-17

## 2020-04-30 NOTE — TELEPHONE ENCOUNTER
Patient notified and verbalized understanding. States she has been using more often and is agreeable to video visit to discuss with [address]  Video visit scheduled. Patient does not currently have the carrie on her phone but will get now.   Advised that once sh

## 2020-04-30 NOTE — TELEPHONE ENCOUNTER
Script sent I notice patient using the med more often then in theh past, please schedule a video visit within the next week or so to discuss anxiety (preferably tomorrow or later, not today)

## 2020-04-30 NOTE — TELEPHONE ENCOUNTER
BUTALBITAL-APAP-CAFFEINE -40 MG Oral Cap      Last refilled: 10/4/19 - 5 caps - 0 refills    Last OV: 1/6/20 - ADHD -       No future appts. Please advise.

## 2020-04-30 NOTE — TELEPHONE ENCOUNTER
CLONAZEPAM 0.5 MG Oral Tab    Last refilled: 3/19/20 - 30 Tabs - 0 refills    Last OV: 1/6/20 - ADHD -       No future appts. Please advise.

## 2020-05-05 NOTE — PROGRESS NOTES
Hailee Herring is a 37year old female.   HPI:   Virtual Video/Telephone Check-In    Hailee Herring verbally consents to a Virtual/Telephone Check-In visit on 5/5/2020    Patient understands and accepts financial responsibility for any deductible, co-insu Dispense Refill   • Amphetamine-Dextroamphet ER (ADDERALL XR) 30 MG Oral Capsule SR 24 Hr Take 1 capsule (30 mg total) by mouth daily.  30 capsule 0   • [START ON 6/5/2020] Amphetamine-Dextroamphet ER (ADDERALL XR) 30 MG Oral Capsule SR 24 Hr Take 1 capsule • Cholecalciferol (VITAMIN D-3) 5000 UNITS Oral Tab Take 1 tablet by mouth.      • ferrous sulfate 325 (65 FE) MG Oral Tab EC Take 325 mg by mouth daily with breakfast.          HISTORY:  Past Medical History:   Diagnosis Date   • Anxiety    • Arthritis review of below:     SASCHA (generalized anxiety disorder)  -suboptimal contrl, but still functionin well and doing okay; declines therapy referall; fine to cont the clonazepam qd for now, but if still wants to take it when school is out we'll need to talk ot

## 2020-06-29 NOTE — TELEPHONE ENCOUNTER
Called pharmacy, they will need a new script sent with date pt will .  Forward to Dr. Bettie Rodriguez to send

## 2020-06-29 NOTE — TELEPHONE ENCOUNTER
It looks like these were prescribed to be filled on 7/6/2020. CAn you please call pharmacy see if they need a new script or can take verbal to fill on 7/3/2020 instead of 7/6.   If they need new script please tell them to cancel the one dated 7/6 and I'll

## 2020-07-01 RX ORDER — CLONAZEPAM 0.5 MG/1
TABLET ORAL
Qty: 30 TABLET | Refills: 0 | Status: SHIPPED | OUTPATIENT
Start: 2020-07-01 | End: 2020-12-30

## 2020-07-01 RX ORDER — DEXTROAMPHETAMINE SACCHARATE, AMPHETAMINE ASPARTATE MONOHYDRATE, DEXTROAMPHETAMINE SULFATE AND AMPHETAMINE SULFATE 7.5; 7.5; 7.5; 7.5 MG/1; MG/1; MG/1; MG/1
30 CAPSULE, EXTENDED RELEASE ORAL DAILY
Qty: 30 CAPSULE | Refills: 0 | Status: SHIPPED | OUTPATIENT
Start: 2020-07-03 | End: 2020-08-02

## 2020-07-01 RX ORDER — DEXTROAMPHETAMINE SACCHARATE, AMPHETAMINE ASPARTATE, DEXTROAMPHETAMINE SULFATE AND AMPHETAMINE SULFATE 7.5; 7.5; 7.5; 7.5 MG/1; MG/1; MG/1; MG/1
30 TABLET ORAL DAILY
Qty: 30 TABLET | Refills: 0 | Status: SHIPPED | OUTPATIENT
Start: 2020-07-03 | End: 2020-08-02

## 2020-08-03 ENCOUNTER — TELEPHONE (OUTPATIENT)
Dept: FAMILY MEDICINE CLINIC | Facility: CLINIC | Age: 35
End: 2020-08-03

## 2020-08-03 RX ORDER — DEXTROAMPHETAMINE SACCHARATE, AMPHETAMINE ASPARTATE MONOHYDRATE, DEXTROAMPHETAMINE SULFATE AND AMPHETAMINE SULFATE 7.5; 7.5; 7.5; 7.5 MG/1; MG/1; MG/1; MG/1
30 CAPSULE, EXTENDED RELEASE ORAL DAILY
Qty: 30 CAPSULE | Refills: 0 | OUTPATIENT
Start: 2020-08-03 | End: 2020-09-02

## 2020-08-03 RX ORDER — DEXTROAMPHETAMINE SACCHARATE, AMPHETAMINE ASPARTATE, DEXTROAMPHETAMINE SULFATE AND AMPHETAMINE SULFATE 7.5; 7.5; 7.5; 7.5 MG/1; MG/1; MG/1; MG/1
30 TABLET ORAL DAILY
Qty: 30 TABLET | Refills: 0 | OUTPATIENT
Start: 2020-08-03 | End: 2020-09-02

## 2020-08-03 RX ORDER — DEXTROAMPHETAMINE SACCHARATE, AMPHETAMINE ASPARTATE, DEXTROAMPHETAMINE SULFATE AND AMPHETAMINE SULFATE 7.5; 7.5; 7.5; 7.5 MG/1; MG/1; MG/1; MG/1
30 TABLET ORAL DAILY
Qty: 30 TABLET | Refills: 0 | Status: SHIPPED | OUTPATIENT
Start: 2020-08-03 | End: 2020-09-02

## 2020-08-03 RX ORDER — DEXTROAMPHETAMINE SACCHARATE, AMPHETAMINE ASPARTATE MONOHYDRATE, DEXTROAMPHETAMINE SULFATE AND AMPHETAMINE SULFATE 7.5; 7.5; 7.5; 7.5 MG/1; MG/1; MG/1; MG/1
CAPSULE, EXTENDED RELEASE ORAL
Qty: 30 CAPSULE | Refills: 0 | OUTPATIENT
Start: 2020-08-03

## 2020-08-03 RX ORDER — DEXTROAMPHETAMINE SACCHARATE, AMPHETAMINE ASPARTATE MONOHYDRATE, DEXTROAMPHETAMINE SULFATE AND AMPHETAMINE SULFATE 7.5; 7.5; 7.5; 7.5 MG/1; MG/1; MG/1; MG/1
30 CAPSULE, EXTENDED RELEASE ORAL DAILY
Qty: 30 CAPSULE | Refills: 0 | Status: SHIPPED | OUTPATIENT
Start: 2020-08-03 | End: 2020-09-02

## 2020-08-03 NOTE — TELEPHONE ENCOUNTER
[id number] [place], [place] - [address] [phone], [phone]     Amphetamine-Dextroamphet ER (ADDERALL XR) 30 MG Oral Capsule SR 24 Hr     amphetamine-dextroamphetamine (ADDERALL) 30 MG Oral Tab

## 2020-08-03 NOTE — TELEPHONE ENCOUNTER
We have received 4 requests for this refill already. 1 was sent to Dr Mary Jordan and is still pending with her. Closing this encounter.

## 2020-08-03 NOTE — TELEPHONE ENCOUNTER
Duplicate-1 request already sent to [facility] to advise.
no chest pain, no cough, and no shortness of breath.

## 2020-08-31 NOTE — TELEPHONE ENCOUNTER
Pt needs a refill of the  amphetamine-dextroamphetamine (ADDERALL) 30 MG Oral Tab  And  Amphetamine-Dextroamphet ER (ADDERALL XR) 30 MG Oral Capsule SR 24 Hr     3 Months    [facility] in [place]

## 2020-09-01 RX ORDER — DEXTROAMPHETAMINE SACCHARATE, AMPHETAMINE ASPARTATE MONOHYDRATE, DEXTROAMPHETAMINE SULFATE AND AMPHETAMINE SULFATE 7.5; 7.5; 7.5; 7.5 MG/1; MG/1; MG/1; MG/1
30 CAPSULE, EXTENDED RELEASE ORAL DAILY
Qty: 30 CAPSULE | Refills: 0 | Status: SHIPPED | OUTPATIENT
Start: 2020-10-01 | End: 2020-10-31

## 2020-09-01 RX ORDER — DEXTROAMPHETAMINE SACCHARATE, AMPHETAMINE ASPARTATE MONOHYDRATE, DEXTROAMPHETAMINE SULFATE AND AMPHETAMINE SULFATE 7.5; 7.5; 7.5; 7.5 MG/1; MG/1; MG/1; MG/1
30 CAPSULE, EXTENDED RELEASE ORAL DAILY
Qty: 30 CAPSULE | Refills: 0 | Status: SHIPPED | OUTPATIENT
Start: 2020-11-01 | End: 2020-12-01

## 2020-09-01 RX ORDER — DEXTROAMPHETAMINE SACCHARATE, AMPHETAMINE ASPARTATE MONOHYDRATE, DEXTROAMPHETAMINE SULFATE AND AMPHETAMINE SULFATE 7.5; 7.5; 7.5; 7.5 MG/1; MG/1; MG/1; MG/1
30 CAPSULE, EXTENDED RELEASE ORAL DAILY
Qty: 30 CAPSULE | Refills: 0 | Status: SHIPPED | OUTPATIENT
Start: 2020-09-01 | End: 2020-12-04

## 2020-09-01 RX ORDER — DEXTROAMPHETAMINE SACCHARATE, AMPHETAMINE ASPARTATE, DEXTROAMPHETAMINE SULFATE AND AMPHETAMINE SULFATE 7.5; 7.5; 7.5; 7.5 MG/1; MG/1; MG/1; MG/1
30 TABLET ORAL DAILY
Qty: 30 TABLET | Refills: 0 | Status: SHIPPED | OUTPATIENT
Start: 2020-10-01 | End: 2020-10-31

## 2020-09-01 RX ORDER — DEXTROAMPHETAMINE SACCHARATE, AMPHETAMINE ASPARTATE, DEXTROAMPHETAMINE SULFATE AND AMPHETAMINE SULFATE 7.5; 7.5; 7.5; 7.5 MG/1; MG/1; MG/1; MG/1
30 TABLET ORAL DAILY
Qty: 30 TABLET | Refills: 0 | Status: SHIPPED | OUTPATIENT
Start: 2020-09-01 | End: 2020-10-01

## 2020-09-01 RX ORDER — DEXTROAMPHETAMINE SACCHARATE, AMPHETAMINE ASPARTATE, DEXTROAMPHETAMINE SULFATE AND AMPHETAMINE SULFATE 7.5; 7.5; 7.5; 7.5 MG/1; MG/1; MG/1; MG/1
30 TABLET ORAL DAILY
Qty: 30 TABLET | Refills: 0 | Status: SHIPPED | OUTPATIENT
Start: 2020-11-01 | End: 2020-12-01

## 2020-10-15 ENCOUNTER — TELEPHONE (OUTPATIENT)
Dept: FAMILY MEDICINE CLINIC | Facility: CLINIC | Age: 35
End: 2020-10-15

## 2020-10-15 DIAGNOSIS — R50.9 FEVER, UNSPECIFIED FEVER CAUSE: Primary | ICD-10-CM

## 2020-10-15 DIAGNOSIS — M79.10 MYALGIA: ICD-10-CM

## 2020-10-15 DIAGNOSIS — R05.9 COUGH: ICD-10-CM

## 2020-10-15 NOTE — TELEPHONE ENCOUNTER
Patient advised of the information per Dr. Irma Welch. Patient advised of the phone number for central scheduling.

## 2020-10-15 NOTE — TELEPHONE ENCOUNTER
PT CALLED AND ADV THAT SHE THINKS THAT SHE HAS THE FLU BUT COULD ALSO BE COVID.     HEADACHE, BODY ACHES, FATIGUED      LOOKING TO HAVE COVID TEST PLACED    PLEASE CALL AND ADV, DID ADV PT THAT  IS NOT IN OFFICE     THANK YOU

## 2020-10-17 ENCOUNTER — LAB ENCOUNTER (OUTPATIENT)
Dept: LAB | Facility: HOSPITAL | Age: 35
End: 2020-10-17
Attending: FAMILY MEDICINE
Payer: COMMERCIAL

## 2020-10-17 DIAGNOSIS — R05.9 COUGH: ICD-10-CM

## 2020-10-17 DIAGNOSIS — M79.10 MYALGIA: ICD-10-CM

## 2020-10-17 DIAGNOSIS — R50.9 FEVER, UNSPECIFIED FEVER CAUSE: ICD-10-CM

## 2020-10-19 RX ORDER — IPRATROPIUM BROMIDE AND ALBUTEROL SULFATE 2.5; .5 MG/3ML; MG/3ML
3 SOLUTION RESPIRATORY (INHALATION) EVERY 4 HOURS PRN
Qty: 90 ML | Refills: 0 | Status: SHIPPED | OUTPATIENT
Start: 2020-10-19 | End: 2020-10-20

## 2020-10-19 NOTE — TELEPHONE ENCOUNTER
Script sent please remind her she's due for 6 month med f/u in November (we did VV in may).   Can be VV or OV if she'd like cpx

## 2020-10-19 NOTE — TELEPHONE ENCOUNTER
Asthma & COPD Medication Protocol Betqlx01/17/2020 08:38 PM   Asthma Action Score greater than or equal to 20    Appointment in past 6 or next 3 months     AAP/ACT given in last 12 months     LOV: 1/6/20  Last Refill:9/10/17     No future appointments.

## 2020-10-20 ENCOUNTER — TELEPHONE (OUTPATIENT)
Dept: FAMILY MEDICINE CLINIC | Facility: CLINIC | Age: 35
End: 2020-10-20

## 2020-10-20 RX ORDER — IPRATROPIUM BROMIDE AND ALBUTEROL SULFATE 2.5; .5 MG/3ML; MG/3ML
3 SOLUTION RESPIRATORY (INHALATION) EVERY 4 HOURS PRN
Qty: 90 ML | Refills: 0 | Status: SHIPPED | OUTPATIENT
Start: 2020-10-20 | End: 2020-10-29

## 2020-10-21 RX ORDER — IPRATROPIUM BROMIDE AND ALBUTEROL SULFATE 2.5; .5 MG/3ML; MG/3ML
3 SOLUTION RESPIRATORY (INHALATION) EVERY 4 HOURS PRN
Qty: 90 ML | Refills: 0 | OUTPATIENT
Start: 2020-10-21

## 2020-10-22 ENCOUNTER — TELEPHONE (OUTPATIENT)
Dept: FAMILY MEDICINE CLINIC | Facility: CLINIC | Age: 35
End: 2020-10-22

## 2020-10-22 NOTE — TELEPHONE ENCOUNTER
Pt called, Can we please fax her covid test results to her empoyer? Fax# [phone], attn: Amarilis CARTER.   Any questions please call pt at [phone]

## 2020-10-30 NOTE — TELEPHONE ENCOUNTER
Last refilled 10/20/20 for #90 ml with 0 RF  Telemed with [facility] 5/5/20  No future appt  Routed to PCP to advise refill  Medication failed protocol due to the following reasons:    Asthma & COPD Medication Protocol Xpflfr81/29/2020 08:55 PM   Asthma Action Sco

## 2020-10-31 RX ORDER — IPRATROPIUM BROMIDE AND ALBUTEROL SULFATE 2.5; .5 MG/3ML; MG/3ML
3 SOLUTION RESPIRATORY (INHALATION) EVERY 4 HOURS PRN
Qty: 90 ML | Refills: 0 | Status: SHIPPED | OUTPATIENT
Start: 2020-10-31 | End: 2020-11-08

## 2020-11-07 ENCOUNTER — TELEPHONE (OUTPATIENT)
Dept: FAMILY MEDICINE CLINIC | Facility: CLINIC | Age: 35
End: 2020-11-07

## 2020-11-07 RX ORDER — IPRATROPIUM BROMIDE AND ALBUTEROL SULFATE 2.5; .5 MG/3ML; MG/3ML
3 SOLUTION RESPIRATORY (INHALATION) EVERY 4 HOURS PRN
Qty: 90 ML | Refills: 0 | Status: CANCELLED | OUTPATIENT
Start: 2020-11-07

## 2020-11-07 NOTE — TELEPHONE ENCOUNTER
Patient requesting refill of ipratropium/albuterol neb    Last OV 5/5/2020 telemed  Last refilled 10/31/2020  90 ml  0 efills    Called and spoke with patient who states she had a cold in the beginning of October and cough is still lingering.   States she i

## 2020-11-08 RX ORDER — IPRATROPIUM BROMIDE AND ALBUTEROL SULFATE 2.5; .5 MG/3ML; MG/3ML
3 SOLUTION RESPIRATORY (INHALATION) EVERY 4 HOURS PRN
Qty: 90 ML | Refills: 0 | Status: SHIPPED | OUTPATIENT
Start: 2020-11-08

## 2020-11-08 RX ORDER — BUDESONIDE AND FORMOTEROL FUMARATE DIHYDRATE 160; 4.5 UG/1; UG/1
2 AEROSOL RESPIRATORY (INHALATION) 2 TIMES DAILY
Qty: 1 INHALER | Refills: 11 | Status: SHIPPED | OUTPATIENT
Start: 2020-11-08 | End: 2021-06-17

## 2020-11-11 RX ORDER — IPRATROPIUM BROMIDE AND ALBUTEROL SULFATE 2.5; .5 MG/3ML; MG/3ML
3 SOLUTION RESPIRATORY (INHALATION) EVERY 4 HOURS PRN
Qty: 90 ML | Refills: 0 | OUTPATIENT
Start: 2020-11-11

## 2020-11-18 ENCOUNTER — HOSPITAL ENCOUNTER (OUTPATIENT)
Age: 35
Discharge: HOME | End: 2020-11-18
Payer: COMMERCIAL

## 2020-11-18 ENCOUNTER — APPOINTMENT (OUTPATIENT)
Dept: GENERAL RADIOLOGY | Age: 35
End: 2020-11-18
Attending: PHYSICIAN ASSISTANT
Payer: COMMERCIAL

## 2020-11-18 ENCOUNTER — TELEPHONE (OUTPATIENT)
Dept: FAMILY MEDICINE CLINIC | Facility: CLINIC | Age: 35
End: 2020-11-18

## 2020-11-18 VITALS
TEMPERATURE: 97 F | DIASTOLIC BLOOD PRESSURE: 76 MMHG | OXYGEN SATURATION: 97 % | HEART RATE: 96 BPM | RESPIRATION RATE: 16 BRPM | SYSTOLIC BLOOD PRESSURE: 135 MMHG

## 2020-11-18 DIAGNOSIS — R05.9 COUGH: Primary | ICD-10-CM

## 2020-11-18 DIAGNOSIS — J42 CHRONIC BRONCHITIS, UNSPECIFIED CHRONIC BRONCHITIS TYPE (HCC): ICD-10-CM

## 2020-11-18 PROCEDURE — 99202 OFFICE O/P NEW SF 15 MIN: CPT | Performed by: PHYSICIAN ASSISTANT

## 2020-11-18 PROCEDURE — 71046 X-RAY EXAM CHEST 2 VIEWS: CPT | Performed by: PHYSICIAN ASSISTANT

## 2020-11-18 RX ORDER — PREDNISONE 20 MG/1
40 TABLET ORAL DAILY
Qty: 10 TABLET | Refills: 0 | Status: SHIPPED | OUTPATIENT
Start: 2020-11-18 | End: 2020-11-23

## 2020-11-18 RX ORDER — AMOXICILLIN AND CLAVULANATE POTASSIUM 875; 125 MG/1; MG/1
1 TABLET, FILM COATED ORAL 2 TIMES DAILY
Qty: 20 TABLET | Refills: 0 | Status: SHIPPED | OUTPATIENT
Start: 2020-11-18 | End: 2020-11-28

## 2020-11-18 NOTE — TELEPHONE ENCOUNTER
Pt called she has had a cold for about a month. Her cough is getting worse. She is unable to return to work bc of the cough.

## 2020-11-18 NOTE — ED INITIAL ASSESSMENT (HPI)
Pt sts cough began in October that has now increased in the past 1 week. Now with SOB with activity. Cough is productive. No fever.

## 2020-11-18 NOTE — TELEPHONE ENCOUNTER
Pete Whitmore verbally {consents to a Virtual/Telephone Check-In service on 12/02/2020 .   Patient understands and accepts financial responsibility for any deductible, co-insurance and/or co-pays associated with Bernard Vogel verbally {consents to/ a

## 2020-11-18 NOTE — TELEPHONE ENCOUNTER
Spoke with the pt and she states that she is still coughging and she was better for a day or 2- Tuesday last week has been coughing- can not walk up the stairs without coughing      No fever-   coughing so much she vomits- no exposure that she is aware of.

## 2020-11-18 NOTE — ED PROVIDER NOTES
Patient Seen in: [facility] [address] [place] [address]      History   Patient presents with:  Cough/URI    Stated Complaint: cough    HPI  This is a 57-year-old female with a medical history of chronic bronchitis, reactive airway disease, pseudotumor cerebri, anxiety an above.    Physical Exam     ED Triage Vitals [11/18/20 1534]   /76   Pulse 96   Resp 16   Temp 97.3 °F (36.3 °C)   Temp src Temporal   SpO2 97 %   O2 Device None (Room air)       Current:/76   Pulse 96   Temp 97.3 °F (36.3 °C) (Temporal)   Resp daily for 10 days.   Qty: 20 tablet Refills: 0

## 2020-12-02 RX ORDER — DEXTROAMPHETAMINE SACCHARATE, AMPHETAMINE ASPARTATE, DEXTROAMPHETAMINE SULFATE AND AMPHETAMINE SULFATE 7.5; 7.5; 7.5; 7.5 MG/1; MG/1; MG/1; MG/1
30 TABLET ORAL EVERY MORNING
Qty: 30 TABLET | Refills: 0 | Status: SHIPPED | OUTPATIENT
Start: 2020-12-02 | End: 2021-02-23

## 2020-12-02 NOTE — PROGRESS NOTES
Amilcar Allen is a 38year old female.   HPI:   Virtual Video/Telephone Check-In    Amilcar Allen verbally consents to a doximity video visit on 12/2/2020    Patient understands and accepts financial responsibility for any deductible, co-insurance and/o Oral Tab TAKE ONE TABLET BY MOUTH DAILY AS NEEDED FOR ANXIETY  30 tablet 0   • amphetamine-dextroamphetamine (ADDERALL) 30 MG Oral Tab Take 1 tablet (30 mg total) by mouth every morning.  Around 4:30am 30 tablet 0   • ipratropium-albuterol 0.5-2.5 (3) MG/3M Use      Smoking status: Current Some Day Smoker        Packs/day: 0.25        Years: 16.00        Pack years: 4        Types: Cigarettes      Smokeless tobacco: Never Used      Tobacco comment: smokes occasionally    Alcohol use:  Yes      Alcohol/week: 0.

## 2020-12-02 NOTE — TELEPHONE ENCOUNTER
Last refilled on 11/1/20 for # 30 with 1 refills  Last OV televisit 12/2/20  No future appointments. Thank you.

## 2020-12-03 ENCOUNTER — PATIENT MESSAGE (OUTPATIENT)
Dept: FAMILY MEDICINE CLINIC | Facility: CLINIC | Age: 35
End: 2020-12-03

## 2020-12-03 RX ORDER — DEXTROAMPHETAMINE SACCHARATE, AMPHETAMINE ASPARTATE MONOHYDRATE, DEXTROAMPHETAMINE SULFATE AND AMPHETAMINE SULFATE 7.5; 7.5; 7.5; 7.5 MG/1; MG/1; MG/1; MG/1
30 CAPSULE, EXTENDED RELEASE ORAL DAILY
Qty: 30 CAPSULE | Refills: 0 | OUTPATIENT
Start: 2020-12-03 | End: 2021-01-02

## 2020-12-04 ENCOUNTER — TELEPHONE (OUTPATIENT)
Dept: FAMILY MEDICINE CLINIC | Facility: CLINIC | Age: 35
End: 2020-12-04

## 2020-12-04 RX ORDER — DEXTROAMPHETAMINE SACCHARATE, AMPHETAMINE ASPARTATE, DEXTROAMPHETAMINE SULFATE AND AMPHETAMINE SULFATE 7.5; 7.5; 7.5; 7.5 MG/1; MG/1; MG/1; MG/1
30 TABLET ORAL DAILY
Qty: 30 TABLET | Refills: 0 | Status: SHIPPED | OUTPATIENT
Start: 2021-02-04 | End: 2021-03-06

## 2020-12-04 RX ORDER — DEXTROAMPHETAMINE SACCHARATE, AMPHETAMINE ASPARTATE MONOHYDRATE, DEXTROAMPHETAMINE SULFATE AND AMPHETAMINE SULFATE 7.5; 7.5; 7.5; 7.5 MG/1; MG/1; MG/1; MG/1
30 CAPSULE, EXTENDED RELEASE ORAL DAILY
Qty: 30 CAPSULE | Refills: 0 | Status: SHIPPED | OUTPATIENT
Start: 2021-01-04 | End: 2021-02-03

## 2020-12-04 RX ORDER — DEXTROAMPHETAMINE SACCHARATE, AMPHETAMINE ASPARTATE MONOHYDRATE, DEXTROAMPHETAMINE SULFATE AND AMPHETAMINE SULFATE 7.5; 7.5; 7.5; 7.5 MG/1; MG/1; MG/1; MG/1
30 CAPSULE, EXTENDED RELEASE ORAL DAILY
Qty: 30 CAPSULE | Refills: 0 | Status: SHIPPED | OUTPATIENT
Start: 2020-12-04 | End: 2021-01-03

## 2020-12-04 RX ORDER — DEXTROAMPHETAMINE SACCHARATE, AMPHETAMINE ASPARTATE, DEXTROAMPHETAMINE SULFATE AND AMPHETAMINE SULFATE 7.5; 7.5; 7.5; 7.5 MG/1; MG/1; MG/1; MG/1
30 TABLET ORAL DAILY
Qty: 30 TABLET | Refills: 0 | Status: SHIPPED | OUTPATIENT
Start: 2021-01-04 | End: 2021-02-03

## 2020-12-04 RX ORDER — DEXTROAMPHETAMINE SACCHARATE, AMPHETAMINE ASPARTATE MONOHYDRATE, DEXTROAMPHETAMINE SULFATE AND AMPHETAMINE SULFATE 7.5; 7.5; 7.5; 7.5 MG/1; MG/1; MG/1; MG/1
30 CAPSULE, EXTENDED RELEASE ORAL DAILY
Qty: 30 CAPSULE | Refills: 0 | Status: SHIPPED | OUTPATIENT
Start: 2021-02-04 | End: 2021-02-23

## 2020-12-04 NOTE — TELEPHONE ENCOUNTER
I'm tryng to sort this out before I call her. Looks like she is asking for 90 day refill of 2 different adderalls?  You refilled the adderall regular release 12/2/20 #30

## 2020-12-04 NOTE — TELEPHONE ENCOUNTER
Patient called very angry. She was seen for a video visit to get meds refilled on 12/2/20. We should have called in meds and we didn't. I informed patient we did call in script on the 2nd.  Patient started yelling, said  \"two separate scripts for the same

## 2020-12-30 RX ORDER — CLONAZEPAM 0.5 MG/1
TABLET ORAL
Qty: 30 TABLET | Refills: 0 | Status: SHIPPED | OUTPATIENT
Start: 2020-12-30 | End: 2021-01-29

## 2020-12-30 NOTE — TELEPHONE ENCOUNTER
Protocol: none  Last refilled 7/1/20 #30 with 0 RF  Telemed with [address] 12/2/20  No future appt  Routed to PCP to advise

## 2021-01-30 RX ORDER — CLONAZEPAM 0.5 MG/1
0.5 TABLET ORAL
Qty: 30 TABLET | Refills: 0 | Status: SHIPPED | OUTPATIENT
Start: 2021-01-30 | End: 2021-03-11

## 2021-02-01 ENCOUNTER — TELEPHONE (OUTPATIENT)
Dept: FAMILY MEDICINE CLINIC | Facility: CLINIC | Age: 36
End: 2021-02-01

## 2021-02-01 ENCOUNTER — LAB ENCOUNTER (OUTPATIENT)
Dept: LAB | Facility: HOSPITAL | Age: 36
End: 2021-02-01
Attending: FAMILY MEDICINE
Payer: COMMERCIAL

## 2021-02-01 DIAGNOSIS — R51.9 NONINTRACTABLE HEADACHE, UNSPECIFIED CHRONICITY PATTERN, UNSPECIFIED HEADACHE TYPE: Primary | ICD-10-CM

## 2021-02-01 DIAGNOSIS — R09.81 NOSE CONGESTION: ICD-10-CM

## 2021-02-01 DIAGNOSIS — R06.02 SOB (SHORTNESS OF BREATH): ICD-10-CM

## 2021-02-01 DIAGNOSIS — R51.9 NONINTRACTABLE HEADACHE, UNSPECIFIED CHRONICITY PATTERN, UNSPECIFIED HEADACHE TYPE: ICD-10-CM

## 2021-02-01 NOTE — TELEPHONE ENCOUNTER
Patient calling. States son is covid positive.   Patient having symptoms as well    Would like covid test ordered

## 2021-02-01 NOTE — TELEPHONE ENCOUNTER
Spoke with the pt and she has HA stuffy nose and SOB    Per [facility] ok to place covid order      Pt states that she has SOB without activity- she has used her neb X3 yesterday she is using her symbicort as directed  She doesn't have an oxygen sensor- she states

## 2021-02-01 NOTE — TELEPHONE ENCOUNTER
Patient notified and verbalized understanding. States she is fine as long as she takes the medication. States she knows when she needs to go to Er.

## 2021-02-02 LAB — SARS-COV-2 RNA RESP QL NAA+PROBE: NOT DETECTED

## 2021-02-23 RX ORDER — DEXTROAMPHETAMINE SACCHARATE, AMPHETAMINE ASPARTATE, DEXTROAMPHETAMINE SULFATE AND AMPHETAMINE SULFATE 7.5; 7.5; 7.5; 7.5 MG/1; MG/1; MG/1; MG/1
30 TABLET ORAL EVERY MORNING
Qty: 30 TABLET | Refills: 0 | Status: SHIPPED | OUTPATIENT
Start: 2021-02-23 | End: 2021-03-30

## 2021-02-23 RX ORDER — DEXTROAMPHETAMINE SACCHARATE, AMPHETAMINE ASPARTATE MONOHYDRATE, DEXTROAMPHETAMINE SULFATE AND AMPHETAMINE SULFATE 7.5; 7.5; 7.5; 7.5 MG/1; MG/1; MG/1; MG/1
30 CAPSULE, EXTENDED RELEASE ORAL DAILY
Qty: 30 CAPSULE | Refills: 0 | Status: SHIPPED | OUTPATIENT
Start: 2021-02-23 | End: 2021-03-25

## 2021-02-23 NOTE — TELEPHONE ENCOUNTER
No refill protocol for this medication. Adderall XR 30MG:  Last refill: 2/04/2021 #30 WITH 0 REFILLS    Adderall 30mg:  Last refill: 2/04/2021 #30 with 0 refills    Last Visit: 12/02/2020 Telemed visit  Next Visit: No future appointments.       Forward t

## 2021-03-11 NOTE — TELEPHONE ENCOUNTER
Protocol: none  Last refilled 1/30/20 #30 with 0 RF  LV with [address]- DOX 12/2/20  No future appt  Routed to PCP to advise

## 2021-03-12 RX ORDER — CLONAZEPAM 0.5 MG/1
0.5 TABLET ORAL
Qty: 30 TABLET | Refills: 0 | Status: SHIPPED | OUTPATIENT
Start: 2021-03-12 | End: 2021-04-28

## 2021-03-13 DIAGNOSIS — Z23 NEED FOR VACCINATION: ICD-10-CM

## 2021-03-17 RX ORDER — BUTALBITAL, ACETAMINOPHEN AND CAFFEINE 300; 40; 50 MG/1; MG/1; MG/1
1 CAPSULE ORAL EVERY 4 HOURS PRN
Qty: 20 CAPSULE | Refills: 0 | Status: SHIPPED | OUTPATIENT
Start: 2021-03-17 | End: 2021-03-30

## 2021-03-17 NOTE — TELEPHONE ENCOUNTER
Last refill on Butalbital #20 with 0 refills on 4 30 2020  Last OV on 12 2 2020- Telemedicine visit for ADHD

## 2021-03-30 NOTE — TELEPHONE ENCOUNTER
Protocol for both: none  Butalbital last refilled 3/17/21 for #20 with 0 RF  Adderall last refilled 2/23/21 for #30 with 0 RF  LV with [address]- DOX 12/2/20  No future appt  Routed to PCP to advise

## 2021-03-31 RX ORDER — DEXTROAMPHETAMINE SACCHARATE, AMPHETAMINE ASPARTATE MONOHYDRATE, DEXTROAMPHETAMINE SULFATE AND AMPHETAMINE SULFATE 7.5; 7.5; 7.5; 7.5 MG/1; MG/1; MG/1; MG/1
30 CAPSULE, EXTENDED RELEASE ORAL DAILY
Qty: 30 CAPSULE | Refills: 0 | Status: SHIPPED | OUTPATIENT
Start: 2021-03-31 | End: 2021-04-28

## 2021-03-31 RX ORDER — DEXTROAMPHETAMINE SACCHARATE, AMPHETAMINE ASPARTATE MONOHYDRATE, DEXTROAMPHETAMINE SULFATE AND AMPHETAMINE SULFATE 7.5; 7.5; 7.5; 7.5 MG/1; MG/1; MG/1; MG/1
30 CAPSULE, EXTENDED RELEASE ORAL DAILY
Qty: 30 CAPSULE | Refills: 0 | Status: SHIPPED | OUTPATIENT
Start: 2021-06-01 | End: 2021-06-28

## 2021-03-31 RX ORDER — DEXTROAMPHETAMINE SACCHARATE, AMPHETAMINE ASPARTATE, DEXTROAMPHETAMINE SULFATE AND AMPHETAMINE SULFATE 7.5; 7.5; 7.5; 7.5 MG/1; MG/1; MG/1; MG/1
30 TABLET ORAL EVERY MORNING
Qty: 30 TABLET | Refills: 0 | Status: SHIPPED | OUTPATIENT
Start: 2021-03-31 | End: 2021-04-28

## 2021-03-31 RX ORDER — DEXTROAMPHETAMINE SACCHARATE, AMPHETAMINE ASPARTATE MONOHYDRATE, DEXTROAMPHETAMINE SULFATE AND AMPHETAMINE SULFATE 7.5; 7.5; 7.5; 7.5 MG/1; MG/1; MG/1; MG/1
30 CAPSULE, EXTENDED RELEASE ORAL DAILY
Qty: 30 CAPSULE | Refills: 0 | Status: SHIPPED | OUTPATIENT
Start: 2021-05-01 | End: 2021-04-30

## 2021-03-31 RX ORDER — BUTALBITAL, ACETAMINOPHEN AND CAFFEINE 300; 40; 50 MG/1; MG/1; MG/1
1 CAPSULE ORAL EVERY 4 HOURS PRN
Qty: 20 CAPSULE | Refills: 0 | Status: SHIPPED | OUTPATIENT
Start: 2021-03-31 | End: 2021-04-28

## 2021-04-28 NOTE — TELEPHONE ENCOUNTER
Last refilled on 03/31/2021for # 30 with 0 rf. For adderall, adderall xr. Last refill on 03/31/2021 for # 20 with o rf for butalbital.    Last refill on 03/12/2021 for 30 with o rf for clonazepam.       Last seen on 12/02/2020. Thank you.

## 2021-04-30 RX ORDER — BUTALBITAL, ACETAMINOPHEN AND CAFFEINE 300; 40; 50 MG/1; MG/1; MG/1
1 CAPSULE ORAL EVERY 4 HOURS PRN
Qty: 20 CAPSULE | Refills: 0 | Status: SHIPPED | OUTPATIENT
Start: 2021-04-30

## 2021-04-30 RX ORDER — CLONAZEPAM 0.5 MG/1
0.5 TABLET ORAL
Qty: 30 TABLET | Refills: 0 | Status: SHIPPED | OUTPATIENT
Start: 2021-04-30 | End: 2021-06-17

## 2021-04-30 RX ORDER — DEXTROAMPHETAMINE SACCHARATE, AMPHETAMINE ASPARTATE MONOHYDRATE, DEXTROAMPHETAMINE SULFATE AND AMPHETAMINE SULFATE 7.5; 7.5; 7.5; 7.5 MG/1; MG/1; MG/1; MG/1
30 CAPSULE, EXTENDED RELEASE ORAL DAILY
Qty: 30 CAPSULE | Refills: 0 | Status: SHIPPED | OUTPATIENT
Start: 2021-04-30 | End: 2021-05-30

## 2021-04-30 RX ORDER — DEXTROAMPHETAMINE SACCHARATE, AMPHETAMINE ASPARTATE, DEXTROAMPHETAMINE SULFATE AND AMPHETAMINE SULFATE 7.5; 7.5; 7.5; 7.5 MG/1; MG/1; MG/1; MG/1
30 TABLET ORAL EVERY MORNING
Qty: 30 TABLET | Refills: 0 | Status: SHIPPED | OUTPATIENT
Start: 2021-04-30 | End: 2021-05-28

## 2021-05-28 RX ORDER — DEXTROAMPHETAMINE SACCHARATE, AMPHETAMINE ASPARTATE MONOHYDRATE, DEXTROAMPHETAMINE SULFATE AND AMPHETAMINE SULFATE 7.5; 7.5; 7.5; 7.5 MG/1; MG/1; MG/1; MG/1
30 CAPSULE, EXTENDED RELEASE ORAL DAILY
Qty: 30 CAPSULE | Refills: 0 | Status: CANCELLED | OUTPATIENT
Start: 2021-05-28 | End: 2021-06-27

## 2021-05-29 NOTE — TELEPHONE ENCOUNTER
No refill protocol for this medication. Pt has refills on the Adderall xr 30mg at the pharmacy. Adderall 30mg:  Last refill: 4/30/2021 #30 with 0 refills  Last Visit: 12/02/2020   Next Visit: No future appointments.       Forward to Dr. Osman Jacobsen

## 2021-05-31 RX ORDER — DEXTROAMPHETAMINE SACCHARATE, AMPHETAMINE ASPARTATE, DEXTROAMPHETAMINE SULFATE AND AMPHETAMINE SULFATE 7.5; 7.5; 7.5; 7.5 MG/1; MG/1; MG/1; MG/1
30 TABLET ORAL EVERY MORNING
Qty: 30 TABLET | Refills: 0 | Status: SHIPPED | OUTPATIENT
Start: 2021-05-31 | End: 2021-06-28

## 2021-06-11 ENCOUNTER — PATIENT MESSAGE (OUTPATIENT)
Dept: FAMILY MEDICINE CLINIC | Facility: CLINIC | Age: 36
End: 2021-06-11

## 2021-06-11 NOTE — TELEPHONE ENCOUNTER
From: Edgar Davenport  To:  Arnulfo Marroquin MD  Sent: 6/11/2021 9:25 AM CDT  Subject: Prescription Question    I have a sinus infection since they can't fit me in at all anytime soon is there any way that I can get antibiotics for it if not then I will end up

## 2021-06-16 RX ORDER — AMOXICILLIN AND CLAVULANATE POTASSIUM 875; 125 MG/1; MG/1
1 TABLET, FILM COATED ORAL 2 TIMES DAILY
Qty: 14 TABLET | Refills: 0 | Status: SHIPPED | OUTPATIENT
Start: 2021-06-16 | End: 2021-06-23

## 2021-06-17 NOTE — TELEPHONE ENCOUNTER
LOV Telemedicine 12/02/2020    Last refill on 04/30/2021, for #3 tabs, with 0 refills  clonazePAM 0.5 MG Oral Tab    Last refill on 11/08/2020, for #1 inhaler, with 11 refills  Budesonide-Formoterol Fumarate (SYMBICORT) 160-4.5 MCG/ACT Inhalation Aerosol

## 2021-06-18 RX ORDER — CLONAZEPAM 0.5 MG/1
0.5 TABLET ORAL
Qty: 30 TABLET | Refills: 0 | Status: SHIPPED | OUTPATIENT
Start: 2021-06-18 | End: 2021-11-24

## 2021-06-18 RX ORDER — BUDESONIDE AND FORMOTEROL FUMARATE DIHYDRATE 160; 4.5 UG/1; UG/1
2 AEROSOL RESPIRATORY (INHALATION) 2 TIMES DAILY
Qty: 1 EACH | Refills: 1 | Status: SHIPPED | OUTPATIENT
Start: 2021-06-18

## 2021-06-28 RX ORDER — DEXTROAMPHETAMINE SACCHARATE, AMPHETAMINE ASPARTATE MONOHYDRATE, DEXTROAMPHETAMINE SULFATE AND AMPHETAMINE SULFATE 7.5; 7.5; 7.5; 7.5 MG/1; MG/1; MG/1; MG/1
30 CAPSULE, EXTENDED RELEASE ORAL DAILY
Qty: 30 CAPSULE | Refills: 0 | Status: SHIPPED | OUTPATIENT
Start: 2021-06-28 | End: 2021-07-28

## 2021-06-28 RX ORDER — DEXTROAMPHETAMINE SACCHARATE, AMPHETAMINE ASPARTATE, DEXTROAMPHETAMINE SULFATE AND AMPHETAMINE SULFATE 7.5; 7.5; 7.5; 7.5 MG/1; MG/1; MG/1; MG/1
30 TABLET ORAL EVERY MORNING
Qty: 30 TABLET | Refills: 0 | Status: SHIPPED | OUTPATIENT
Start: 2021-06-28 | End: 2021-11-24

## 2021-06-28 NOTE — TELEPHONE ENCOUNTER
Last refilled on 05/31/2021 for # 30 with 0 rf. For adderall 30mg. Last refilled on 06/01/2021 for #30 with 0 rf. For adderall xr  Last seen on 12/02/2020. Thank you.

## 2021-07-14 ENCOUNTER — TELEPHONE (OUTPATIENT)
Dept: FAMILY MEDICINE CLINIC | Facility: CLINIC | Age: 36
End: 2021-07-14

## 2021-07-14 NOTE — TELEPHONE ENCOUNTER
Patient wants to know if she needs to come in. She has been feeling extremely tired and thinks her iron levels are low.  Please advise# [phone]

## 2021-07-14 NOTE — TELEPHONE ENCOUNTER
Future Appointments   Date Time Provider Ольга Cheng   7/19/2021 10:00 AM Nichole Arrington MD [facility] [place]     Patient having 2 week between periods and is feeing exhausted.

## 2021-07-21 ENCOUNTER — PATIENT MESSAGE (OUTPATIENT)
Dept: FAMILY MEDICINE CLINIC | Facility: CLINIC | Age: 36
End: 2021-07-21

## 2021-07-21 ENCOUNTER — OFFICE VISIT (OUTPATIENT)
Dept: FAMILY MEDICINE CLINIC | Facility: CLINIC | Age: 36
End: 2021-07-21
Payer: COMMERCIAL

## 2021-07-21 VITALS
OXYGEN SATURATION: 99 % | HEART RATE: 85 BPM | WEIGHT: 272.19 LBS | BODY MASS INDEX: 44.8 KG/M2 | DIASTOLIC BLOOD PRESSURE: 86 MMHG | HEIGHT: 65.5 IN | TEMPERATURE: 96 F | SYSTOLIC BLOOD PRESSURE: 126 MMHG

## 2021-07-21 DIAGNOSIS — D50.9 IRON DEFICIENCY ANEMIA, UNSPECIFIED IRON DEFICIENCY ANEMIA TYPE: ICD-10-CM

## 2021-07-21 DIAGNOSIS — Z13.220 LIPID SCREENING: ICD-10-CM

## 2021-07-21 DIAGNOSIS — E55.9 VITAMIN D DEFICIENCY: ICD-10-CM

## 2021-07-21 DIAGNOSIS — N92.0 EXCESSIVE AND FREQUENT MENSTRUATION: ICD-10-CM

## 2021-07-21 DIAGNOSIS — R42 DIZZINESS: Primary | ICD-10-CM

## 2021-07-21 DIAGNOSIS — F90.0 ATTENTION DEFICIT HYPERACTIVITY DISORDER (ADHD), PREDOMINANTLY INATTENTIVE TYPE: ICD-10-CM

## 2021-07-21 DIAGNOSIS — R03.0 PRE-HYPERTENSION: ICD-10-CM

## 2021-07-21 DIAGNOSIS — Z13.1 DIABETES MELLITUS SCREENING: ICD-10-CM

## 2021-07-21 DIAGNOSIS — R53.82 CHRONIC FATIGUE: ICD-10-CM

## 2021-07-21 LAB
ALBUMIN SERPL-MCNC: 2.9 G/DL (ref 3.4–5)
ALBUMIN/GLOB SERPL: 0.7 {RATIO} (ref 1–2)
ALP LIVER SERPL-CCNC: 118 U/L
ALT SERPL-CCNC: 33 U/L
ANION GAP SERPL CALC-SCNC: 4 MMOL/L (ref 0–18)
AST SERPL-CCNC: 21 U/L (ref 15–37)
BASOPHILS # BLD AUTO: 0.04 X10(3) UL (ref 0–0.2)
BASOPHILS NFR BLD AUTO: 0.5 %
BILIRUB SERPL-MCNC: 0.3 MG/DL (ref 0.1–2)
BUN BLD-MCNC: 17 MG/DL (ref 7–18)
BUN/CREAT SERPL: 23.3 (ref 10–20)
CALCIUM BLD-MCNC: 8.1 MG/DL (ref 8.5–10.1)
CHLORIDE SERPL-SCNC: 108 MMOL/L (ref 98–112)
CHOLEST SMN-MCNC: 242 MG/DL (ref ?–200)
CO2 SERPL-SCNC: 25 MMOL/L (ref 21–32)
CREAT BLD-MCNC: 0.73 MG/DL
DEPRECATED HBV CORE AB SER IA-ACNC: 39.8 NG/ML
DEPRECATED RDW RBC AUTO: 44.9 FL (ref 35.1–46.3)
EOSINOPHIL # BLD AUTO: 0.8 X10(3) UL (ref 0–0.7)
EOSINOPHIL NFR BLD AUTO: 10.4 %
ERYTHROCYTE [DISTWIDTH] IN BLOOD BY AUTOMATED COUNT: 13.8 % (ref 11–15)
EST. AVERAGE GLUCOSE BLD GHB EST-MCNC: 120 MG/DL (ref 68–126)
GLOBULIN PLAS-MCNC: 4 G/DL (ref 2.8–4.4)
GLUCOSE BLD-MCNC: 88 MG/DL (ref 70–99)
HBA1C MFR BLD HPLC: 5.8 % (ref ?–5.7)
HCT VFR BLD AUTO: 34.8 %
HDLC SERPL-MCNC: 54 MG/DL (ref 40–59)
HGB BLD-MCNC: 11 G/DL
IMM GRANULOCYTES # BLD AUTO: 0.01 X10(3) UL (ref 0–1)
IMM GRANULOCYTES NFR BLD: 0.1 %
LDLC SERPL CALC-MCNC: 177 MG/DL (ref ?–100)
LYMPHOCYTES # BLD AUTO: 1.92 X10(3) UL (ref 1–4)
LYMPHOCYTES NFR BLD AUTO: 24.9 %
M PROTEIN MFR SERPL ELPH: 6.9 G/DL (ref 6.4–8.2)
MCH RBC QN AUTO: 28.1 PG (ref 26–34)
MCHC RBC AUTO-ENTMCNC: 31.6 G/DL (ref 31–37)
MCV RBC AUTO: 89 FL
MONOCYTES # BLD AUTO: 0.41 X10(3) UL (ref 0.1–1)
MONOCYTES NFR BLD AUTO: 5.3 %
NEUTROPHILS # BLD AUTO: 4.54 X10 (3) UL (ref 1.5–7.7)
NEUTROPHILS # BLD AUTO: 4.54 X10(3) UL (ref 1.5–7.7)
NEUTROPHILS NFR BLD AUTO: 58.8 %
NONHDLC SERPL-MCNC: 188 MG/DL (ref ?–130)
OSMOLALITY SERPL CALC.SUM OF ELEC: 285 MOSM/KG (ref 275–295)
PATIENT FASTING Y/N/NP: YES
PATIENT FASTING Y/N/NP: YES
PLATELET # BLD AUTO: 337 10(3)UL (ref 150–450)
POTASSIUM SERPL-SCNC: 3.9 MMOL/L (ref 3.5–5.1)
RBC # BLD AUTO: 3.91 X10(6)UL
SODIUM SERPL-SCNC: 137 MMOL/L (ref 136–145)
T3 SERPL-MCNC: 91 NG/DL (ref 60–181)
T4 FREE SERPL-MCNC: 1 NG/DL (ref 0.8–1.7)
TRIGL SERPL-MCNC: 68 MG/DL (ref 30–149)
TSI SER-ACNC: 1.78 MIU/ML (ref 0.36–3.74)
VIT D+METAB SERPL-MCNC: 23.9 NG/ML (ref 30–100)
VLDLC SERPL CALC-MCNC: 14 MG/DL (ref 0–30)
WBC # BLD AUTO: 7.7 X10(3) UL (ref 4–11)

## 2021-07-21 PROCEDURE — 84480 ASSAY TRIIODOTHYRONINE (T3): CPT | Performed by: FAMILY MEDICINE

## 2021-07-21 PROCEDURE — 83036 HEMOGLOBIN GLYCOSYLATED A1C: CPT | Performed by: FAMILY MEDICINE

## 2021-07-21 PROCEDURE — 82728 ASSAY OF FERRITIN: CPT | Performed by: FAMILY MEDICINE

## 2021-07-21 PROCEDURE — 3079F DIAST BP 80-89 MM HG: CPT | Performed by: FAMILY MEDICINE

## 2021-07-21 PROCEDURE — 84439 ASSAY OF FREE THYROXINE: CPT | Performed by: FAMILY MEDICINE

## 2021-07-21 PROCEDURE — 82306 VITAMIN D 25 HYDROXY: CPT | Performed by: FAMILY MEDICINE

## 2021-07-21 PROCEDURE — 80061 LIPID PANEL: CPT | Performed by: FAMILY MEDICINE

## 2021-07-21 PROCEDURE — 3074F SYST BP LT 130 MM HG: CPT | Performed by: FAMILY MEDICINE

## 2021-07-21 PROCEDURE — 80050 GENERAL HEALTH PANEL: CPT | Performed by: FAMILY MEDICINE

## 2021-07-21 PROCEDURE — 99214 OFFICE O/P EST MOD 30 MIN: CPT | Performed by: FAMILY MEDICINE

## 2021-07-21 PROCEDURE — 3008F BODY MASS INDEX DOCD: CPT | Performed by: FAMILY MEDICINE

## 2021-07-21 NOTE — PROGRESS NOTES
Willis Zacarias is a 43year old female. 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     She started working out 3x/week in April 45minutes, she and her hsuband wo Inhalation Solution Take 3 mL by nebulization every 4 (four) hours as needed. 90 mL 0   • Albuterol Sulfate HFA (PROAIR HFA) 108 (90 Base) MCG/ACT Inhalation Aero Soln Inhale 2 puffs into the lungs every 4 (four) hours as needed for Wheezing.  8.5 g 0   • R (1.664 m)   Wt 272 lb 3.2 oz (123.5 kg)   LMP 07/14/2021   SpO2 99%   BMI 44.61 kg/m²   GENERAL: well developed, well nourished,in no apparent distress  SKIN: no rashes,no suspicious lesions on face/ant neck  HEENT: atraumatic, normocephalic  NECK: supple, menstruation-see above  -     VENIPUNCTURE  -     CBC WITH DIFFERENTIAL WITH PLATELET; Future  -     COMP METABOLIC PANEL (14); Future  -     FERRITIN; Future  -     HEMOGLOBIN A1C; Future  -     LIPID PANEL;  Future  -     ASSAY, THYROID STIM HORMONE; Futu TRIIODOTHYRONINE (T3) TOTAL; Future  -     FREE T4 (FREE THYROXINE);  Future  -     VITAMIN D, 25-HYDROXY; Future    ADHD  -Well controlled, CPM, f/u in 6 months; signed note for work she can drive     The patient indicates understanding of these issues and

## 2021-07-22 NOTE — TELEPHONE ENCOUNTER
From: Evelyn Zeng  To: Mey Solomon MD  Sent: 7/21/2021 5:42 PM CDT  Subject: Test Results Question    Its the over the counter vitamins D 10,00? But i forget to take them.

## 2021-07-27 RX ORDER — DEXTROAMPHETAMINE SACCHARATE, AMPHETAMINE ASPARTATE MONOHYDRATE, DEXTROAMPHETAMINE SULFATE AND AMPHETAMINE SULFATE 7.5; 7.5; 7.5; 7.5 MG/1; MG/1; MG/1; MG/1
30 CAPSULE, EXTENDED RELEASE ORAL DAILY
Qty: 30 CAPSULE | Refills: 0 | Status: CANCELLED | OUTPATIENT
Start: 2021-07-27 | End: 2021-08-26

## 2021-07-27 RX ORDER — DEXTROAMPHETAMINE SACCHARATE, AMPHETAMINE ASPARTATE, DEXTROAMPHETAMINE SULFATE AND AMPHETAMINE SULFATE 7.5; 7.5; 7.5; 7.5 MG/1; MG/1; MG/1; MG/1
30 TABLET ORAL EVERY MORNING
Qty: 30 TABLET | Refills: 0 | Status: CANCELLED | OUTPATIENT
Start: 2021-07-27

## 2021-07-28 NOTE — TELEPHONE ENCOUNTER
Protocol for both medications: none  Both last refilled 6/28/21 for #30 with 0 RF  LOV with [address] 7/21/21  No future appt   Routed to PCP to advise

## 2021-07-29 RX ORDER — DEXTROAMPHETAMINE SACCHARATE, AMPHETAMINE ASPARTATE MONOHYDRATE, DEXTROAMPHETAMINE SULFATE AND AMPHETAMINE SULFATE 7.5; 7.5; 7.5; 7.5 MG/1; MG/1; MG/1; MG/1
30 CAPSULE, EXTENDED RELEASE ORAL DAILY
Qty: 30 CAPSULE | Refills: 0 | Status: SHIPPED | OUTPATIENT
Start: 2021-07-29 | End: 2021-08-28

## 2021-07-29 RX ORDER — DEXTROAMPHETAMINE SACCHARATE, AMPHETAMINE ASPARTATE, DEXTROAMPHETAMINE SULFATE AND AMPHETAMINE SULFATE 7.5; 7.5; 7.5; 7.5 MG/1; MG/1; MG/1; MG/1
30 TABLET ORAL DAILY
Qty: 30 TABLET | Refills: 0 | Status: SHIPPED | OUTPATIENT
Start: 2021-08-29 | End: 2021-09-28

## 2021-07-29 RX ORDER — DEXTROAMPHETAMINE SACCHARATE, AMPHETAMINE ASPARTATE, DEXTROAMPHETAMINE SULFATE AND AMPHETAMINE SULFATE 7.5; 7.5; 7.5; 7.5 MG/1; MG/1; MG/1; MG/1
30 TABLET ORAL DAILY
Qty: 30 TABLET | Refills: 0 | Status: SHIPPED | OUTPATIENT
Start: 2021-07-29 | End: 2021-08-28

## 2021-07-29 RX ORDER — DEXTROAMPHETAMINE SACCHARATE, AMPHETAMINE ASPARTATE, DEXTROAMPHETAMINE SULFATE AND AMPHETAMINE SULFATE 7.5; 7.5; 7.5; 7.5 MG/1; MG/1; MG/1; MG/1
30 TABLET ORAL DAILY
Qty: 30 TABLET | Refills: 0 | Status: SHIPPED | OUTPATIENT
Start: 2021-09-29 | End: 2021-11-01

## 2021-07-29 RX ORDER — DEXTROAMPHETAMINE SACCHARATE, AMPHETAMINE ASPARTATE MONOHYDRATE, DEXTROAMPHETAMINE SULFATE AND AMPHETAMINE SULFATE 7.5; 7.5; 7.5; 7.5 MG/1; MG/1; MG/1; MG/1
30 CAPSULE, EXTENDED RELEASE ORAL DAILY
Qty: 30 CAPSULE | Refills: 0 | Status: SHIPPED | OUTPATIENT
Start: 2021-08-29 | End: 2021-09-28

## 2021-07-29 RX ORDER — DEXTROAMPHETAMINE SACCHARATE, AMPHETAMINE ASPARTATE MONOHYDRATE, DEXTROAMPHETAMINE SULFATE AND AMPHETAMINE SULFATE 7.5; 7.5; 7.5; 7.5 MG/1; MG/1; MG/1; MG/1
30 CAPSULE, EXTENDED RELEASE ORAL DAILY
Qty: 30 CAPSULE | Refills: 0 | Status: SHIPPED | OUTPATIENT
Start: 2021-09-29 | End: 2021-11-01

## 2021-11-01 RX ORDER — DEXTROAMPHETAMINE SACCHARATE, AMPHETAMINE ASPARTATE MONOHYDRATE, DEXTROAMPHETAMINE SULFATE AND AMPHETAMINE SULFATE 7.5; 7.5; 7.5; 7.5 MG/1; MG/1; MG/1; MG/1
30 CAPSULE, EXTENDED RELEASE ORAL DAILY
Qty: 30 CAPSULE | Refills: 0 | Status: SHIPPED | OUTPATIENT
Start: 2021-11-01 | End: 2021-11-24

## 2021-11-01 RX ORDER — DEXTROAMPHETAMINE SACCHARATE, AMPHETAMINE ASPARTATE, DEXTROAMPHETAMINE SULFATE AND AMPHETAMINE SULFATE 7.5; 7.5; 7.5; 7.5 MG/1; MG/1; MG/1; MG/1
30 TABLET ORAL DAILY
Qty: 30 TABLET | Refills: 0 | Status: SHIPPED | OUTPATIENT
Start: 2021-11-01 | End: 2021-11-24

## 2021-11-01 NOTE — TELEPHONE ENCOUNTER
Amphetamine-Dextroamphet ER (ADDERALL XR) 30 MG Oral Capsule SR 24 Hr      &      amphetamine-dextroamphetamine (ADDERALL) 30 MG Oral Tab         PT WOULD LIKE REFILL SENT TO  [id number] [place], [place] - [address], [phone]

## 2021-11-01 NOTE — TELEPHONE ENCOUNTER
Last OV 7/21/21 Hank Blackwell)  Both doses last refilled 7/29/21 3 month script    Future Appointments   Date Time Provider Ольга Cheng   11/22/2021  9:20 AM Ronni Badlwin MD [facility] [place]

## 2021-11-24 NOTE — PROGRESS NOTES
Subjective:   Patient ID: Collette Must is a 43year old female. Patient presents to clinic today for medication follow-up. History of ADHD and general anxiety disorder.   Patient currently taking Adderall 30, takes around 4 AM.  Then takes Adderall X (ADDERALL) 30 MG Oral Tab Take 1 tablet (30 mg total) by mouth every morning. Around 4:30am 30 tablet 0   • Budesonide-Formoterol Fumarate (SYMBICORT) 160-4.5 MCG/ACT Inhalation Aerosol Inhale 2 puffs into the lungs 2 (two) times daily.  1 each 1   • Butalb Affect: Mood normal.         Assessment & Plan:   Attention deficit hyperactivity disorder (ADHD), predominantly inattentive type  (primary encounter diagnosis)  SASCHA (generalized anxiety disorder)    No orders of the defined types were placed in this encou

## 2021-12-20 RX ORDER — DEXTROAMPHETAMINE SACCHARATE, AMPHETAMINE ASPARTATE, DEXTROAMPHETAMINE SULFATE AND AMPHETAMINE SULFATE 7.5; 7.5; 7.5; 7.5 MG/1; MG/1; MG/1; MG/1
30 TABLET ORAL EVERY MORNING
Qty: 30 TABLET | Refills: 0 | Status: SHIPPED | OUTPATIENT
Start: 2021-12-20 | End: 2022-01-19

## 2021-12-20 RX ORDER — DEXTROAMPHETAMINE SACCHARATE, AMPHETAMINE ASPARTATE MONOHYDRATE, DEXTROAMPHETAMINE SULFATE AND AMPHETAMINE SULFATE 7.5; 7.5; 7.5; 7.5 MG/1; MG/1; MG/1; MG/1
30 CAPSULE, EXTENDED RELEASE ORAL DAILY
Qty: 30 CAPSULE | Refills: 0 | Status: SHIPPED | OUTPATIENT
Start: 2021-12-20 | End: 2022-01-19

## 2022-01-19 RX ORDER — DEXTROAMPHETAMINE SACCHARATE, AMPHETAMINE ASPARTATE, DEXTROAMPHETAMINE SULFATE AND AMPHETAMINE SULFATE 7.5; 7.5; 7.5; 7.5 MG/1; MG/1; MG/1; MG/1
30 TABLET ORAL EVERY MORNING
Qty: 30 TABLET | Refills: 0 | Status: SHIPPED | OUTPATIENT
Start: 2022-01-19

## 2022-01-19 RX ORDER — DEXTROAMPHETAMINE SACCHARATE, AMPHETAMINE ASPARTATE MONOHYDRATE, DEXTROAMPHETAMINE SULFATE AND AMPHETAMINE SULFATE 7.5; 7.5; 7.5; 7.5 MG/1; MG/1; MG/1; MG/1
30 CAPSULE, EXTENDED RELEASE ORAL DAILY
Qty: 30 CAPSULE | Refills: 0 | Status: SHIPPED | OUTPATIENT
Start: 2022-01-19 | End: 2022-02-18

## 2022-01-19 NOTE — TELEPHONE ENCOUNTER
Routing to provider per protocol.    amphetamine-dextroamphetamine (ADDERALL XR) 30 MG Oral Capsule SR 24 Hr  Last refilled on 12/20/21 for #30  with 0 rf.   amphetamine-dextroamphetamine (ADDERALL) 30 MG Oral Tab  Last refilled on 12/20/21 for #30  with 0

## 2022-02-18 RX ORDER — DEXTROAMPHETAMINE SACCHARATE, AMPHETAMINE ASPARTATE, DEXTROAMPHETAMINE SULFATE AND AMPHETAMINE SULFATE 7.5; 7.5; 7.5; 7.5 MG/1; MG/1; MG/1; MG/1
30 TABLET ORAL EVERY MORNING
Qty: 30 TABLET | Refills: 0 | Status: SHIPPED | OUTPATIENT
Start: 2022-02-18

## 2022-02-18 RX ORDER — DEXTROAMPHETAMINE SACCHARATE, AMPHETAMINE ASPARTATE MONOHYDRATE, DEXTROAMPHETAMINE SULFATE AND AMPHETAMINE SULFATE 7.5; 7.5; 7.5; 7.5 MG/1; MG/1; MG/1; MG/1
30 CAPSULE, EXTENDED RELEASE ORAL DAILY
Qty: 30 CAPSULE | Refills: 0 | Status: SHIPPED | OUTPATIENT
Start: 2022-02-18 | End: 2022-03-20

## 2022-02-18 NOTE — TELEPHONE ENCOUNTER
Future Appointments   Date Time Provider Ольга Cheng   3/14/2022 11:00 AM Micki Gunderson MD [facility] Carmin Opitz

## 2022-02-18 NOTE — TELEPHONE ENCOUNTER
No refill protocol for this medication. Adderall XR 30mg:   Last refill: 1/19/2022 #30 with 0 refills    Adderall 30mg:  Last refill: 1/19/2022 #30 with 0 refills    Last Visit: 11/24/2021 with Swink Form  Next Visit: No future appointments. Forward to Dr. Cynthia Fuller please advise on refills. Thanks.

## 2022-02-27 NOTE — TELEPHONE ENCOUNTER
LRF  10/18/19  LOV  7/22/19    No future appointments.       Pended 3 months- pt will be due for follow up in [place] pt BIBA from home s/p eating a kiwi had allergy as kid but was unaware , pt was given 50 PO Benadryl and 1 dose of epi by mom before EMS; pt raspy in triage and feels nauseous

## 2022-05-12 DIAGNOSIS — F90.0 ATTENTION DEFICIT HYPERACTIVITY DISORDER (ADHD), PREDOMINANTLY INATTENTIVE TYPE: ICD-10-CM

## 2022-05-12 RX ORDER — DEXTROAMPHETAMINE SACCHARATE, AMPHETAMINE ASPARTATE MONOHYDRATE, DEXTROAMPHETAMINE SULFATE AND AMPHETAMINE SULFATE 7.5; 7.5; 7.5; 7.5 MG/1; MG/1; MG/1; MG/1
30 CAPSULE, EXTENDED RELEASE ORAL DAILY
Qty: 30 CAPSULE | Refills: 0 | Status: CANCELLED | OUTPATIENT
Start: 2022-05-12 | End: 2022-06-11

## 2022-05-12 RX ORDER — DEXTROAMPHETAMINE SACCHARATE, AMPHETAMINE ASPARTATE, DEXTROAMPHETAMINE SULFATE AND AMPHETAMINE SULFATE 7.5; 7.5; 7.5; 7.5 MG/1; MG/1; MG/1; MG/1
30 TABLET ORAL DAILY
Qty: 30 TABLET | Refills: 0 | Status: CANCELLED | OUTPATIENT
Start: 2022-05-12 | End: 2022-06-11

## 2022-05-19 DIAGNOSIS — F90.0 ATTENTION DEFICIT HYPERACTIVITY DISORDER (ADHD), PREDOMINANTLY INATTENTIVE TYPE: ICD-10-CM

## 2022-05-20 RX ORDER — DEXTROAMPHETAMINE SACCHARATE, AMPHETAMINE ASPARTATE, DEXTROAMPHETAMINE SULFATE AND AMPHETAMINE SULFATE 7.5; 7.5; 7.5; 7.5 MG/1; MG/1; MG/1; MG/1
30 TABLET ORAL DAILY
Qty: 30 TABLET | Refills: 0 | OUTPATIENT
Start: 2022-05-20 | End: 2022-06-19

## 2022-05-20 RX ORDER — DEXTROAMPHETAMINE SACCHARATE, AMPHETAMINE ASPARTATE MONOHYDRATE, DEXTROAMPHETAMINE SULFATE AND AMPHETAMINE SULFATE 7.5; 7.5; 7.5; 7.5 MG/1; MG/1; MG/1; MG/1
30 CAPSULE, EXTENDED RELEASE ORAL DAILY
Qty: 30 CAPSULE | Refills: 0 | OUTPATIENT
Start: 2022-05-20 | End: 2022-06-19

## 2022-05-20 NOTE — TELEPHONE ENCOUNTER
amphetamine-dextroamphetamine (ADDERALL XR) 30 MG Oral Capsule SR 24 Hr 30 capsule 0 5/19/2022 6/18/2022       Medication Quantity Refills Start End   amphetamine-dextroamphetamine (ADDERALL) 30 MG Oral Tab 30 tablet 0 5/19/2022 6/18/2022     Medication sent to pharmacy 5/19/22    Refill refused

## 2022-06-09 PROBLEM — E66.01 MORBID (SEVERE) OBESITY DUE TO EXCESS CALORIES (HCC): Status: ACTIVE | Noted: 2022-06-09

## 2022-06-13 ENCOUNTER — PATIENT MESSAGE (OUTPATIENT)
Dept: FAMILY MEDICINE CLINIC | Facility: CLINIC | Age: 37
End: 2022-06-13

## 2022-06-14 NOTE — TELEPHONE ENCOUNTER
From: Tarik Show  To: Anne-Marie Guan MD  Sent: 6/13/2022 5:58 PM CDT  Subject: Dr note for work     I was off work from may 31st until june 8th for hurting my nack. I now need a doctor's note for those days. I did talk about hurting my nack when i was in on june 9th.

## 2022-06-14 NOTE — TELEPHONE ENCOUNTER
Letter sent. A lot was discussed at her prior OV and neck pain was mentioned in passing. If recurrent will need to evaluated for the same. For now, sounds like she is better.

## 2022-07-07 ENCOUNTER — TELEPHONE (OUTPATIENT)
Dept: FAMILY MEDICINE CLINIC | Facility: CLINIC | Age: 37
End: 2022-07-07

## 2022-07-07 NOTE — TELEPHONE ENCOUNTER
Form faxed to #[phone]    Pt notified of note above  Pt requesting original copy back - placed in pt blue book for pt     Send copy to scanning

## 2022-07-07 NOTE — TELEPHONE ENCOUNTER
Received form from pt regarding DOT required medical clearance for medication use:  Ada Lam to review and sign    LOV 06/09/2022    Future Appointments   Date Time Provider Ольга Cheng   9/1/2022 10:40 AM Sharon Isaac MD [facility]       Need to fax to #[phone]    Send to scanning

## 2022-07-17 DIAGNOSIS — F90.0 ATTENTION DEFICIT HYPERACTIVITY DISORDER (ADHD), PREDOMINANTLY INATTENTIVE TYPE: ICD-10-CM

## 2022-07-17 DIAGNOSIS — Z51.81 MEDICATION MONITORING ENCOUNTER: ICD-10-CM

## 2022-07-17 RX ORDER — DEXTROAMPHETAMINE SACCHARATE, AMPHETAMINE ASPARTATE MONOHYDRATE, DEXTROAMPHETAMINE SULFATE AND AMPHETAMINE SULFATE 7.5; 7.5; 7.5; 7.5 MG/1; MG/1; MG/1; MG/1
30 CAPSULE, EXTENDED RELEASE ORAL DAILY
Qty: 30 CAPSULE | Refills: 0 | Status: CANCELLED | OUTPATIENT
Start: 2022-07-17 | End: 2022-08-16

## 2022-07-18 NOTE — TELEPHONE ENCOUNTER
LOV 06/09/2022    Last refill on 06/09/22, for #30 caps, with 0 refills  amphetamine-dextroamphetamine (ADDERALL) 30 MG Oral Tab    Future Appointments   Date Time Provider Ольга Cheng   9/1/2022 10:40 AM Claudetta Newton, Mydhili, MD [facility] MARY LOU Conway     Order(s) pending, please review. Thank you.

## 2022-07-19 DIAGNOSIS — Z51.81 MEDICATION MONITORING ENCOUNTER: ICD-10-CM

## 2022-07-19 DIAGNOSIS — F90.0 ATTENTION DEFICIT HYPERACTIVITY DISORDER (ADHD), PREDOMINANTLY INATTENTIVE TYPE: ICD-10-CM

## 2022-07-19 RX ORDER — DEXTROAMPHETAMINE SACCHARATE, AMPHETAMINE ASPARTATE, DEXTROAMPHETAMINE SULFATE AND AMPHETAMINE SULFATE 7.5; 7.5; 7.5; 7.5 MG/1; MG/1; MG/1; MG/1
30 TABLET ORAL EVERY MORNING
Qty: 30 TABLET | Refills: 0 | Status: SHIPPED | OUTPATIENT
Start: 2022-07-19

## 2022-07-19 NOTE — TELEPHONE ENCOUNTER
No refill protocol for this medication. Last refill: 6/09/2022 #30 with 0 refills  Last Visit: 6/09/2022   Next Visit:   Future Appointments   Date Time Provider Ольга Cheng   9/1/2022 10:40 AM Alma Harrell MD [facility] MARY LOU Rico         Forward to Dr. Felton Cash please advise on refills. Thanks.

## 2022-07-20 RX ORDER — DEXTROAMPHETAMINE SACCHARATE, AMPHETAMINE ASPARTATE, DEXTROAMPHETAMINE SULFATE AND AMPHETAMINE SULFATE 7.5; 7.5; 7.5; 7.5 MG/1; MG/1; MG/1; MG/1
30 TABLET ORAL EVERY MORNING
Qty: 30 TABLET | Refills: 0 | OUTPATIENT
Start: 2022-07-20

## 2022-08-17 DIAGNOSIS — F90.0 ATTENTION DEFICIT HYPERACTIVITY DISORDER (ADHD), PREDOMINANTLY INATTENTIVE TYPE: ICD-10-CM

## 2022-08-17 DIAGNOSIS — Z51.81 MEDICATION MONITORING ENCOUNTER: ICD-10-CM

## 2022-08-17 RX ORDER — DEXTROAMPHETAMINE SACCHARATE, AMPHETAMINE ASPARTATE MONOHYDRATE, DEXTROAMPHETAMINE SULFATE AND AMPHETAMINE SULFATE 7.5; 7.5; 7.5; 7.5 MG/1; MG/1; MG/1; MG/1
30 CAPSULE, EXTENDED RELEASE ORAL DAILY
Qty: 30 CAPSULE | Refills: 0 | OUTPATIENT
Start: 2022-08-17 | End: 2022-09-16

## 2022-08-17 RX ORDER — DEXTROAMPHETAMINE SACCHARATE, AMPHETAMINE ASPARTATE, DEXTROAMPHETAMINE SULFATE AND AMPHETAMINE SULFATE 7.5; 7.5; 7.5; 7.5 MG/1; MG/1; MG/1; MG/1
30 TABLET ORAL EVERY MORNING
Qty: 30 TABLET | Refills: 0 | Status: SHIPPED | OUTPATIENT
Start: 2022-08-17

## 2022-08-17 NOTE — TELEPHONE ENCOUNTER
Routing to provider per protocol. AMPHETAMINE-DEXTROAMPHETAMINE 30 MG Oral Tab  Last refilled on 7/19/22 for #30  with 0 rf. Last labs 7/21/21. Last seen on 6/9/22. Future Appointments   Date Time Provider Ольга Cheng   9/1/2022 10:40 AM Oziel Mike MD [facility] Nando Ojeda          Thank you.

## 2022-08-26 ENCOUNTER — TELEPHONE (OUTPATIENT)
Dept: FAMILY MEDICINE CLINIC | Facility: CLINIC | Age: 37
End: 2022-08-26

## 2022-08-26 NOTE — TELEPHONE ENCOUNTER
Letter mailed to patient reminding her she has overdue orders.     Lab Frequency Next Occurrence   CBC WITH DIFFERENTIAL WITH PLATELET Once 07/49/3706   COMP METABOLIC PANEL (14) Once 50/67/8913   TSH W REFLEX TO FREE T4 Once 06/09/2022   LIPID PANEL Once 06/09/2022   HEMOGLOBIN A1C Once 06/09/2022   VITAMIN D Once 06/09/2022

## 2022-09-12 ENCOUNTER — TELEPHONE (OUTPATIENT)
Dept: FAMILY MEDICINE CLINIC | Facility: CLINIC | Age: 37
End: 2022-09-12

## 2022-09-12 ENCOUNTER — HOSPITAL ENCOUNTER (OUTPATIENT)
Dept: ULTRASOUND IMAGING | Facility: HOSPITAL | Age: 37
Discharge: HOME | End: 2022-09-12
Attending: FAMILY MEDICINE
Payer: COMMERCIAL

## 2022-09-12 ENCOUNTER — OFFICE VISIT (OUTPATIENT)
Dept: FAMILY MEDICINE CLINIC | Facility: CLINIC | Age: 37
End: 2022-09-12
Payer: COMMERCIAL

## 2022-09-12 VITALS
HEART RATE: 75 BPM | BODY MASS INDEX: 46 KG/M2 | WEIGHT: 278.38 LBS | OXYGEN SATURATION: 99 % | TEMPERATURE: 98 F | SYSTOLIC BLOOD PRESSURE: 122 MMHG | DIASTOLIC BLOOD PRESSURE: 80 MMHG | RESPIRATION RATE: 18 BRPM

## 2022-09-12 DIAGNOSIS — R10.11 RIGHT UPPER QUADRANT ABDOMINAL PAIN: Primary | ICD-10-CM

## 2022-09-12 DIAGNOSIS — R10.11 RIGHT UPPER QUADRANT ABDOMINAL PAIN: ICD-10-CM

## 2022-09-12 DIAGNOSIS — Z83.79 FAMILY HISTORY OF GALLBLADDER DISEASE: ICD-10-CM

## 2022-09-12 DIAGNOSIS — R11.0 NAUSEA: ICD-10-CM

## 2022-09-12 DIAGNOSIS — K90.49 INTOLERANCE TO FATTY FOOD: ICD-10-CM

## 2022-09-12 LAB
ALBUMIN SERPL-MCNC: 2.9 G/DL (ref 3.4–5)
ALBUMIN/GLOB SERPL: 0.7 {RATIO} (ref 1–2)
ALP LIVER SERPL-CCNC: 105 U/L
ALT SERPL-CCNC: 25 U/L
ANION GAP SERPL CALC-SCNC: 4 MMOL/L (ref 0–18)
AST SERPL-CCNC: 14 U/L (ref 15–37)
BASOPHILS # BLD AUTO: 0.05 X10(3) UL (ref 0–0.2)
BASOPHILS NFR BLD AUTO: 0.5 %
BILIRUB SERPL-MCNC: 0.3 MG/DL (ref 0.1–2)
BUN BLD-MCNC: 23 MG/DL (ref 7–18)
CALCIUM BLD-MCNC: 8.6 MG/DL (ref 8.5–10.1)
CHLORIDE SERPL-SCNC: 107 MMOL/L (ref 98–112)
CO2 SERPL-SCNC: 27 MMOL/L (ref 21–32)
CREAT BLD-MCNC: 0.72 MG/DL
EOSINOPHIL # BLD AUTO: 0.44 X10(3) UL (ref 0–0.7)
EOSINOPHIL NFR BLD AUTO: 4.7 %
ERYTHROCYTE [DISTWIDTH] IN BLOOD BY AUTOMATED COUNT: 13.9 %
FASTING STATUS PATIENT QL REPORTED: NO
GFR SERPLBLD BASED ON 1.73 SQ M-ARVRAT: 109 ML/MIN/1.73M2 (ref 60–?)
GLOBULIN PLAS-MCNC: 3.9 G/DL (ref 2.8–4.4)
GLUCOSE BLD-MCNC: 98 MG/DL (ref 70–99)
HCT VFR BLD AUTO: 37.5 %
HGB BLD-MCNC: 11.8 G/DL
IMM GRANULOCYTES # BLD AUTO: 0.03 X10(3) UL (ref 0–1)
IMM GRANULOCYTES NFR BLD: 0.3 %
LIPASE SERPL-CCNC: 214 U/L (ref 73–393)
LYMPHOCYTES # BLD AUTO: 2.07 X10(3) UL (ref 1–4)
LYMPHOCYTES NFR BLD AUTO: 22 %
MCH RBC QN AUTO: 28.5 PG (ref 26–34)
MCHC RBC AUTO-ENTMCNC: 31.5 G/DL (ref 31–37)
MCV RBC AUTO: 90.6 FL
MONOCYTES # BLD AUTO: 0.59 X10(3) UL (ref 0.1–1)
MONOCYTES NFR BLD AUTO: 6.3 %
NEUTROPHILS # BLD AUTO: 6.25 X10 (3) UL (ref 1.5–7.7)
NEUTROPHILS # BLD AUTO: 6.25 X10(3) UL (ref 1.5–7.7)
NEUTROPHILS NFR BLD AUTO: 66.2 %
OSMOLALITY SERPL CALC.SUM OF ELEC: 290 MOSM/KG (ref 275–295)
PLATELET # BLD AUTO: 367 10(3)UL (ref 150–450)
POTASSIUM SERPL-SCNC: 4 MMOL/L (ref 3.5–5.1)
PROT SERPL-MCNC: 6.8 G/DL (ref 6.4–8.2)
RBC # BLD AUTO: 4.14 X10(6)UL
SODIUM SERPL-SCNC: 138 MMOL/L (ref 136–145)
WBC # BLD AUTO: 9.4 X10(3) UL (ref 4–11)

## 2022-09-12 PROCEDURE — 3079F DIAST BP 80-89 MM HG: CPT | Performed by: FAMILY MEDICINE

## 2022-09-12 PROCEDURE — 83690 ASSAY OF LIPASE: CPT | Performed by: FAMILY MEDICINE

## 2022-09-12 PROCEDURE — 80053 COMPREHEN METABOLIC PANEL: CPT | Performed by: FAMILY MEDICINE

## 2022-09-12 PROCEDURE — 3074F SYST BP LT 130 MM HG: CPT | Performed by: FAMILY MEDICINE

## 2022-09-12 PROCEDURE — 99214 OFFICE O/P EST MOD 30 MIN: CPT | Performed by: FAMILY MEDICINE

## 2022-09-12 PROCEDURE — 76700 US EXAM ABDOM COMPLETE: CPT | Performed by: FAMILY MEDICINE

## 2022-09-12 PROCEDURE — 85025 COMPLETE CBC W/AUTO DIFF WBC: CPT | Performed by: FAMILY MEDICINE

## 2022-09-12 NOTE — TELEPHONE ENCOUNTER
Discussed with Dr. Maine Sigala regarding note below - please update letter for work and schedule follow-up appt    Future Appointments   Date Time Provider Ольга Cheng   9/21/2022 10:00 AM Anne-Marie Velásquez MD [facility] Hiram Hardy   11/17/2022 11:00 AM Anne-Marie Velásquez MD [facility] [employer] - letter pending, please review and sign.   Thank you    Please send back to nurse pool - need to fax to  fax #[phone]

## 2022-09-12 NOTE — TELEPHONE ENCOUNTER
PT CALLED AND ADV THAT SHE NEEDS NOTE TO BE CHANGED TO     8/30/22 AND HAVE IT OPEN ENDED!      PLEASE CALL AND ADV     THANK YOU

## 2022-09-13 ENCOUNTER — TELEPHONE (OUTPATIENT)
Dept: FAMILY MEDICINE CLINIC | Facility: CLINIC | Age: 37
End: 2022-09-13

## 2022-09-13 NOTE — TELEPHONE ENCOUNTER
Pt called she was sent for STAT US and labs. She has not received a call back with results. Per pt abdominal pain is getting worse.

## 2022-09-13 NOTE — TELEPHONE ENCOUNTER
Patient notified and verbalized understanding. Advised patient message will be forwarded to MM to advise on further recs/next steps but should be seen at [facility] today if pain is worsening.

## 2022-09-14 NOTE — TELEPHONE ENCOUNTER
NOTED: US was normal, and labs were normal as well. Uncertain of where this pain is originating from at this time. Was she seen in [address] [place] [address] ?

## 2022-09-15 RX ORDER — DICYCLOMINE HYDROCHLORIDE 10 MG/1
10 CAPSULE ORAL EVERY 8 HOURS PRN
Qty: 9 CAPSULE | Refills: 0 | Status: SHIPPED | OUTPATIENT
Start: 2022-09-15 | End: 2022-09-18

## 2022-09-15 NOTE — TELEPHONE ENCOUNTER
Advised patient of Doctor's note below. Patient verbalized understanding - Pt with allergies to bananas. No further questions at this time.     Future Appointments   Date Time Provider Ольга Cheng   9/21/2022 10:00 AM Chaz Mckenna MD [facility]   11/17/2022 11:00 AM Sarai Vann MD [facility]     Rx sent to [facility]

## 2022-09-15 NOTE — TELEPHONE ENCOUNTER
Advised patient of Doctor's note below. Patient verbalized understanding. Pt very anxious - stated \"Dr. Nakul Cuello said I may need surgery in 24-48 hours, that was Monday and I'm freaking out\"    Advised pt that US and labs were normal - she v/u    Pt reports ibuprofen not helping with pain  Did not go to [facility] for recommendations - update plan of care?   Pt requesting call back today    Please advise, thank you

## 2022-09-15 NOTE — TELEPHONE ENCOUNTER
This plan was discussed with the assumption that she may have some inflammation in the gall bladder region. The blood tests and the US do not show any inflammation or any gall stones. If she is continuing to have pain - please have her switch to a [employer]. No grease / no fatty foods, no dairy at this time. Rx Bentyl 10 mg every 8 hours X 3 days as needed for pain. Follow up in 1-2 weeks to reassess.

## 2022-09-17 DIAGNOSIS — F90.0 ATTENTION DEFICIT HYPERACTIVITY DISORDER (ADHD), PREDOMINANTLY INATTENTIVE TYPE: ICD-10-CM

## 2022-09-17 DIAGNOSIS — Z51.81 MEDICATION MONITORING ENCOUNTER: ICD-10-CM

## 2022-09-17 RX ORDER — DEXTROAMPHETAMINE SACCHARATE, AMPHETAMINE ASPARTATE MONOHYDRATE, DEXTROAMPHETAMINE SULFATE AND AMPHETAMINE SULFATE 7.5; 7.5; 7.5; 7.5 MG/1; MG/1; MG/1; MG/1
30 CAPSULE, EXTENDED RELEASE ORAL DAILY
Qty: 30 CAPSULE | Refills: 0 | Status: SHIPPED | OUTPATIENT
Start: 2022-09-17 | End: 2022-10-17

## 2022-09-17 RX ORDER — DEXTROAMPHETAMINE SACCHARATE, AMPHETAMINE ASPARTATE, DEXTROAMPHETAMINE SULFATE AND AMPHETAMINE SULFATE 7.5; 7.5; 7.5; 7.5 MG/1; MG/1; MG/1; MG/1
30 TABLET ORAL EVERY MORNING
Qty: 30 TABLET | Refills: 0 | Status: SHIPPED | OUTPATIENT
Start: 2022-09-17

## 2022-09-17 NOTE — TELEPHONE ENCOUNTER
Both medications pended with correct pharmacy  Last refilled 8/18/22 & 8/17/22  LOV with CHANTELLE 9/12/22  Future appt with CHANTELLE 9/21/22  Protocol: none

## 2022-09-17 NOTE — TELEPHONE ENCOUNTER
[facility] #[id number] - [place] - [address] [place] [address] [phone], [phone]   [address] [place] [address]   Phone: [phone] Fax: [phone]     PATIENT REQUESTING REFILL ON BOTH     ADDERALL 30MG   ADDERALL   XR 30MG

## 2022-09-21 ENCOUNTER — OFFICE VISIT (OUTPATIENT)
Dept: FAMILY MEDICINE CLINIC | Facility: CLINIC | Age: 37
End: 2022-09-21

## 2022-09-21 DIAGNOSIS — R31.29 OTHER MICROSCOPIC HEMATURIA: ICD-10-CM

## 2022-09-21 DIAGNOSIS — R10.11 RIGHT UPPER QUADRANT PAIN: Primary | ICD-10-CM

## 2022-09-21 DIAGNOSIS — R10.9 RIGHT FLANK PAIN: ICD-10-CM

## 2022-09-21 LAB
BILIRUBIN: NEGATIVE
GLUCOSE (URINE DIPSTICK): NEGATIVE MG/DL
KETONES (URINE DIPSTICK): NEGATIVE MG/DL
MULTISTIX LOT#: ABNORMAL NUMERIC
NITRITE, URINE: NEGATIVE
PH, URINE: 6 (ref 4.5–8)
PROTEIN (URINE DIPSTICK): 100 MG/DL
SPECIFIC GRAVITY: 1.03 (ref 1–1.03)
URINE-COLOR: YELLOW
UROBILINOGEN,SEMI-QN: 0.2 MG/DL (ref 0–1.9)

## 2022-09-21 PROCEDURE — 81003 URINALYSIS AUTO W/O SCOPE: CPT | Performed by: FAMILY MEDICINE

## 2022-09-21 PROCEDURE — 99214 OFFICE O/P EST MOD 30 MIN: CPT | Performed by: FAMILY MEDICINE

## 2022-09-21 PROCEDURE — 87086 URINE CULTURE/COLONY COUNT: CPT | Performed by: FAMILY MEDICINE

## 2022-09-21 RX ORDER — DICYCLOMINE HYDROCHLORIDE 10 MG/1
10 CAPSULE ORAL 4 TIMES DAILY
Qty: 20 CAPSULE | Refills: 0 | Status: SHIPPED | OUTPATIENT
Start: 2022-09-21 | End: 2022-09-26

## 2022-09-23 RX ORDER — CIPROFLOXACIN 500 MG/1
500 TABLET, FILM COATED ORAL 2 TIMES DAILY
Qty: 14 TABLET | Refills: 0 | Status: SHIPPED | OUTPATIENT
Start: 2022-09-23 | End: 2022-09-30

## 2022-10-01 ENCOUNTER — HOSPITAL ENCOUNTER (OUTPATIENT)
Dept: CT IMAGING | Age: 37
Discharge: HOME | End: 2022-10-01
Attending: FAMILY MEDICINE
Payer: COMMERCIAL

## 2022-10-01 DIAGNOSIS — R10.11 RIGHT UPPER QUADRANT PAIN: ICD-10-CM

## 2022-10-01 DIAGNOSIS — R10.9 RIGHT FLANK PAIN: ICD-10-CM

## 2022-10-01 DIAGNOSIS — R31.29 OTHER MICROSCOPIC HEMATURIA: ICD-10-CM

## 2022-10-01 PROCEDURE — 74178 CT ABD&PLV WO CNTR FLWD CNTR: CPT | Performed by: FAMILY MEDICINE

## 2022-10-03 ENCOUNTER — TELEPHONE (OUTPATIENT)
Dept: FAMILY MEDICINE CLINIC | Facility: CLINIC | Age: 37
End: 2022-10-03

## 2022-10-03 NOTE — TELEPHONE ENCOUNTER
----- Message from Anne-Marie Crespo MD sent at 10/3/2022  1:43 PM CDT -----  CT scan - no evidence of anything acute. Fatty liver noted.

## 2022-10-10 ENCOUNTER — PATIENT MESSAGE (OUTPATIENT)
Dept: FAMILY MEDICINE CLINIC | Facility: CLINIC | Age: 37
End: 2022-10-10

## 2022-10-11 ENCOUNTER — PATIENT MESSAGE (OUTPATIENT)
Dept: FAMILY MEDICINE CLINIC | Facility: CLINIC | Age: 37
End: 2022-10-11

## 2022-10-11 NOTE — TELEPHONE ENCOUNTER
From: Giovanni Screen  To: Anne-Marie Lee MD  Sent: 10/10/2022 8:48 PM CDT  Subject: Work note    I need a return to work note . I need a return to work note stating that I can return to work on 10/17/2022.

## 2022-10-11 NOTE — TELEPHONE ENCOUNTER
From: Tigre Estrada  Sent: 10/11/2022 5:27 PM CDT  To: Lupe Gannon Clinical Staff  Subject: Work note    Can you change the return to work date to 10/17/2022 please.

## 2022-10-13 RX ORDER — DICYCLOMINE HYDROCHLORIDE 10 MG/1
10 CAPSULE ORAL 4 TIMES DAILY
Qty: 20 CAPSULE | Refills: 0 | Status: SHIPPED | OUTPATIENT
Start: 2022-10-13 | End: 2022-10-18

## 2022-10-13 NOTE — TELEPHONE ENCOUNTER
Gastrointestinal Medication Protocol Passed 10/13/2022 05:00 PM    Appointment in past 6 or next 1 month     OrdEr per protocol

## 2022-10-17 DIAGNOSIS — Z51.81 MEDICATION MONITORING ENCOUNTER: ICD-10-CM

## 2022-10-17 DIAGNOSIS — F90.0 ATTENTION DEFICIT HYPERACTIVITY DISORDER (ADHD), PREDOMINANTLY INATTENTIVE TYPE: ICD-10-CM

## 2022-10-18 RX ORDER — DEXTROAMPHETAMINE SACCHARATE, AMPHETAMINE ASPARTATE MONOHYDRATE, DEXTROAMPHETAMINE SULFATE AND AMPHETAMINE SULFATE 7.5; 7.5; 7.5; 7.5 MG/1; MG/1; MG/1; MG/1
30 CAPSULE, EXTENDED RELEASE ORAL DAILY
Qty: 30 CAPSULE | Refills: 0 | Status: SHIPPED | OUTPATIENT
Start: 2022-10-18 | End: 2022-11-17

## 2022-10-18 RX ORDER — DEXTROAMPHETAMINE SACCHARATE, AMPHETAMINE ASPARTATE, DEXTROAMPHETAMINE SULFATE AND AMPHETAMINE SULFATE 7.5; 7.5; 7.5; 7.5 MG/1; MG/1; MG/1; MG/1
30 TABLET ORAL EVERY MORNING
Qty: 30 TABLET | Refills: 0 | Status: SHIPPED | OUTPATIENT
Start: 2022-10-18

## 2022-11-11 NOTE — Clinical Note
03/27/2017        [facility] [place] [address]      Dear Bimal Brumfield,    [facility] records indicate that you have outstanding fasting lab work and or testing that was ordered for you and has not yet been completed:  LIPID  To provide you with
Patient unable to complete

## 2022-11-17 DIAGNOSIS — Z51.81 MEDICATION MONITORING ENCOUNTER: ICD-10-CM

## 2022-11-17 DIAGNOSIS — F90.0 ATTENTION DEFICIT HYPERACTIVITY DISORDER (ADHD), PREDOMINANTLY INATTENTIVE TYPE: ICD-10-CM

## 2022-11-17 RX ORDER — DEXTROAMPHETAMINE SACCHARATE, AMPHETAMINE ASPARTATE, DEXTROAMPHETAMINE SULFATE AND AMPHETAMINE SULFATE 7.5; 7.5; 7.5; 7.5 MG/1; MG/1; MG/1; MG/1
30 TABLET ORAL EVERY MORNING
Qty: 30 TABLET | Refills: 0 | Status: SHIPPED | OUTPATIENT
Start: 2022-11-17

## 2022-11-17 RX ORDER — DEXTROAMPHETAMINE SACCHARATE, AMPHETAMINE ASPARTATE MONOHYDRATE, DEXTROAMPHETAMINE SULFATE AND AMPHETAMINE SULFATE 7.5; 7.5; 7.5; 7.5 MG/1; MG/1; MG/1; MG/1
30 CAPSULE, EXTENDED RELEASE ORAL DAILY
Qty: 30 CAPSULE | Refills: 0 | Status: SHIPPED | OUTPATIENT
Start: 2022-11-17 | End: 2022-12-17

## 2022-11-17 NOTE — TELEPHONE ENCOUNTER
Routing to provider per protocol. amphetamine-dextroamphetamine ER (ADDERALL XR) 30 MG Oral Capsule SR 24 Hr  Last refilled on 10/18/22 for #30  with 0 rf.     amphetamine-dextroamphetamine 30 MG Oral Tab  Last refilled on 10/18/22 for #30  with 0 rf. Last labs 9/12/22. Last seen on 9/21/22. No future appointments. Thank you.

## 2022-12-16 ENCOUNTER — OFFICE VISIT (OUTPATIENT)
Dept: FAMILY MEDICINE CLINIC | Facility: CLINIC | Age: 37
End: 2022-12-16
Payer: COMMERCIAL

## 2022-12-16 ENCOUNTER — TELEPHONE (OUTPATIENT)
Dept: FAMILY MEDICINE CLINIC | Facility: CLINIC | Age: 37
End: 2022-12-16

## 2022-12-16 ENCOUNTER — HOSPITAL ENCOUNTER (OUTPATIENT)
Dept: GENERAL RADIOLOGY | Age: 37
Discharge: HOME | End: 2022-12-16
Attending: FAMILY MEDICINE

## 2022-12-16 VITALS
BODY MASS INDEX: 46 KG/M2 | HEART RATE: 80 BPM | SYSTOLIC BLOOD PRESSURE: 118 MMHG | DIASTOLIC BLOOD PRESSURE: 80 MMHG | TEMPERATURE: 97 F | OXYGEN SATURATION: 97 % | HEIGHT: 65.5 IN

## 2022-12-16 DIAGNOSIS — R42 LIGHTHEADEDNESS: ICD-10-CM

## 2022-12-16 DIAGNOSIS — J18.9 COMMUNITY ACQUIRED PNEUMONIA OF RIGHT MIDDLE LOBE OF LUNG: Primary | ICD-10-CM

## 2022-12-16 DIAGNOSIS — F90.0 ATTENTION DEFICIT HYPERACTIVITY DISORDER (ADHD), PREDOMINANTLY INATTENTIVE TYPE: ICD-10-CM

## 2022-12-16 DIAGNOSIS — Z51.81 MEDICATION MONITORING ENCOUNTER: ICD-10-CM

## 2022-12-16 DIAGNOSIS — J18.9 COMMUNITY ACQUIRED PNEUMONIA OF RIGHT MIDDLE LOBE OF LUNG: ICD-10-CM

## 2022-12-16 LAB
ALBUMIN SERPL-MCNC: 2.9 G/DL (ref 3.4–5)
ALBUMIN/GLOB SERPL: 0.8 {RATIO} (ref 1–2)
ALP LIVER SERPL-CCNC: 113 U/L
ALT SERPL-CCNC: 21 U/L
ANION GAP SERPL CALC-SCNC: 9 MMOL/L (ref 0–18)
AST SERPL-CCNC: 15 U/L (ref 15–37)
BASOPHILS # BLD AUTO: 0.03 X10(3) UL (ref 0–0.2)
BASOPHILS NFR BLD AUTO: 0.3 %
BILIRUB SERPL-MCNC: 0.4 MG/DL (ref 0.1–2)
BUN BLD-MCNC: 24 MG/DL (ref 7–18)
CALCIUM BLD-MCNC: 9.3 MG/DL (ref 8.5–10.1)
CHLORIDE SERPL-SCNC: 108 MMOL/L (ref 98–112)
CO2 SERPL-SCNC: 23 MMOL/L (ref 21–32)
CREAT BLD-MCNC: 0.96 MG/DL
EOSINOPHIL # BLD AUTO: 0.19 X10(3) UL (ref 0–0.7)
EOSINOPHIL NFR BLD AUTO: 1.8 %
ERYTHROCYTE [DISTWIDTH] IN BLOOD BY AUTOMATED COUNT: 14 %
FASTING STATUS PATIENT QL REPORTED: YES
GFR SERPLBLD BASED ON 1.73 SQ M-ARVRAT: 77 ML/MIN/1.73M2 (ref 60–?)
GLOBULIN PLAS-MCNC: 3.8 G/DL (ref 2.8–4.4)
GLUCOSE BLD-MCNC: 123 MG/DL (ref 70–99)
HCT VFR BLD AUTO: 37.9 %
HGB BLD-MCNC: 12.3 G/DL
IMM GRANULOCYTES # BLD AUTO: 0.05 X10(3) UL (ref 0–1)
IMM GRANULOCYTES NFR BLD: 0.5 %
LYMPHOCYTES # BLD AUTO: 1.76 X10(3) UL (ref 1–4)
LYMPHOCYTES NFR BLD AUTO: 16.9 %
MCH RBC QN AUTO: 28.1 PG (ref 26–34)
MCHC RBC AUTO-ENTMCNC: 32.5 G/DL (ref 31–37)
MCV RBC AUTO: 86.5 FL
MONOCYTES # BLD AUTO: 0.8 X10(3) UL (ref 0.1–1)
MONOCYTES NFR BLD AUTO: 7.7 %
NEUTROPHILS # BLD AUTO: 7.59 X10 (3) UL (ref 1.5–7.7)
NEUTROPHILS # BLD AUTO: 7.59 X10(3) UL (ref 1.5–7.7)
NEUTROPHILS NFR BLD AUTO: 72.8 %
OSMOLALITY SERPL CALC.SUM OF ELEC: 295 MOSM/KG (ref 275–295)
PLATELET # BLD AUTO: 408 10(3)UL (ref 150–450)
POTASSIUM SERPL-SCNC: 4.1 MMOL/L (ref 3.5–5.1)
PROT SERPL-MCNC: 6.7 G/DL (ref 6.4–8.2)
RBC # BLD AUTO: 4.38 X10(6)UL
SODIUM SERPL-SCNC: 140 MMOL/L (ref 136–145)
WBC # BLD AUTO: 10.4 X10(3) UL (ref 4–11)

## 2022-12-16 PROCEDURE — 71046 X-RAY EXAM CHEST 2 VIEWS: CPT | Performed by: FAMILY MEDICINE

## 2022-12-16 PROCEDURE — 3079F DIAST BP 80-89 MM HG: CPT | Performed by: FAMILY MEDICINE

## 2022-12-16 PROCEDURE — 99214 OFFICE O/P EST MOD 30 MIN: CPT | Performed by: FAMILY MEDICINE

## 2022-12-16 PROCEDURE — 85025 COMPLETE CBC W/AUTO DIFF WBC: CPT | Performed by: FAMILY MEDICINE

## 2022-12-16 PROCEDURE — 80053 COMPREHEN METABOLIC PANEL: CPT | Performed by: FAMILY MEDICINE

## 2022-12-16 PROCEDURE — 3074F SYST BP LT 130 MM HG: CPT | Performed by: FAMILY MEDICINE

## 2022-12-16 NOTE — TELEPHONE ENCOUNTER
LOV 12/16/22    Last refill on 11/17/2022, for #30 caps, with 0 refills  amphetamine-dextroamphetamine ER (ADDERALL XR) 30 MG Oral Capsule SR 24 Hr      Last refill on 11/17/2022, for #30 tabs, with 0 refills  amphetamine-dextroamphetamine 30 MG Oral Tab    No future appointments. Order(s) pending, please review. Thank you.

## 2022-12-16 NOTE — TELEPHONE ENCOUNTER
----- Message from Anne-Marie Marroquin MD sent at 12/16/2022 11:31 AM CST -----  Please call Rodrick Carroll and let her know that the pneumonia has completely resolved so this is good news. I am awaiting the labs, but for now, staying at home, resting, deep breathing exercises 10-15 minutes after using the inhaler and hydration recommend. Provide her a note for work to cover her until 12/20. She may return on 12/21 - Wednesday once her labs are back and cleared. Please let her know. Thank you.

## 2022-12-18 ENCOUNTER — PATIENT MESSAGE (OUTPATIENT)
Dept: FAMILY MEDICINE CLINIC | Facility: CLINIC | Age: 37
End: 2022-12-18

## 2022-12-18 DIAGNOSIS — Z51.81 MEDICATION MONITORING ENCOUNTER: ICD-10-CM

## 2022-12-18 DIAGNOSIS — F90.0 ATTENTION DEFICIT HYPERACTIVITY DISORDER (ADHD), PREDOMINANTLY INATTENTIVE TYPE: ICD-10-CM

## 2022-12-18 RX ORDER — DEXTROAMPHETAMINE SACCHARATE, AMPHETAMINE ASPARTATE, DEXTROAMPHETAMINE SULFATE AND AMPHETAMINE SULFATE 7.5; 7.5; 7.5; 7.5 MG/1; MG/1; MG/1; MG/1
30 TABLET ORAL EVERY MORNING
Qty: 30 TABLET | Refills: 0 | Status: CANCELLED | OUTPATIENT
Start: 2022-12-18

## 2022-12-19 ENCOUNTER — TELEPHONE (OUTPATIENT)
Dept: FAMILY MEDICINE CLINIC | Facility: CLINIC | Age: 37
End: 2022-12-19

## 2022-12-19 RX ORDER — DEXTROAMPHETAMINE SACCHARATE, AMPHETAMINE ASPARTATE MONOHYDRATE, DEXTROAMPHETAMINE SULFATE AND AMPHETAMINE SULFATE 7.5; 7.5; 7.5; 7.5 MG/1; MG/1; MG/1; MG/1
30 CAPSULE, EXTENDED RELEASE ORAL DAILY
Qty: 30 CAPSULE | Refills: 0 | Status: SHIPPED | OUTPATIENT
Start: 2022-12-19 | End: 2023-01-18

## 2022-12-19 RX ORDER — DEXTROAMPHETAMINE SACCHARATE, AMPHETAMINE ASPARTATE, DEXTROAMPHETAMINE SULFATE AND AMPHETAMINE SULFATE 7.5; 7.5; 7.5; 7.5 MG/1; MG/1; MG/1; MG/1
30 TABLET ORAL EVERY MORNING
Qty: 30 TABLET | Refills: 0 | Status: SHIPPED | OUTPATIENT
Start: 2022-12-19

## 2022-12-19 NOTE — TELEPHONE ENCOUNTER
LOV 12/16/2022  Last labs 12/16/2022  Last refill on 11/17/2022, for #30 tabs, with 0 refills  amphetamine-dextroamphetamine 30 MG Oral Tab    No future appointments.

## 2022-12-22 ENCOUNTER — TELEPHONE (OUTPATIENT)
Dept: FAMILY MEDICINE CLINIC | Facility: CLINIC | Age: 37
End: 2022-12-22

## 2023-01-17 DIAGNOSIS — F90.0 ATTENTION DEFICIT HYPERACTIVITY DISORDER (ADHD), PREDOMINANTLY INATTENTIVE TYPE: ICD-10-CM

## 2023-01-17 DIAGNOSIS — Z51.81 MEDICATION MONITORING ENCOUNTER: ICD-10-CM

## 2023-01-17 RX ORDER — DEXTROAMPHETAMINE SACCHARATE, AMPHETAMINE ASPARTATE, DEXTROAMPHETAMINE SULFATE AND AMPHETAMINE SULFATE 7.5; 7.5; 7.5; 7.5 MG/1; MG/1; MG/1; MG/1
30 TABLET ORAL EVERY MORNING
Qty: 30 TABLET | Refills: 0 | Status: SHIPPED | OUTPATIENT
Start: 2023-01-17

## 2023-01-17 RX ORDER — DEXTROAMPHETAMINE SACCHARATE, AMPHETAMINE ASPARTATE MONOHYDRATE, DEXTROAMPHETAMINE SULFATE AND AMPHETAMINE SULFATE 7.5; 7.5; 7.5; 7.5 MG/1; MG/1; MG/1; MG/1
30 CAPSULE, EXTENDED RELEASE ORAL DAILY
Qty: 30 CAPSULE | Refills: 0 | Status: SHIPPED | OUTPATIENT
Start: 2023-01-17 | End: 2023-02-16

## 2023-01-17 NOTE — TELEPHONE ENCOUNTER
No refill protocol for this medication. Adderall XR 30mg:  Last refill: 12/19/2022 #30 with 0 refills    Adderall 30mg:  Last refill: 12/19/2022 #30 with 0 refills      Last Visit: 12/16/2022  Next Visit: No future appointments. Forward to Dr. Tammy Davey please advise on refills. Thanks.

## 2023-02-02 ENCOUNTER — APPOINTMENT (OUTPATIENT)
Dept: GENERAL RADIOLOGY | Facility: HOSPITAL | Age: 38
End: 2023-02-02
Attending: STUDENT IN AN ORGANIZED HEALTH CARE EDUCATION/TRAINING PROGRAM

## 2023-02-02 ENCOUNTER — HOSPITAL ENCOUNTER (EMERGENCY)
Facility: HOSPITAL | Age: 38
Discharge: HOME | End: 2023-02-02
Attending: STUDENT IN AN ORGANIZED HEALTH CARE EDUCATION/TRAINING PROGRAM

## 2023-02-02 ENCOUNTER — OFFICE VISIT (OUTPATIENT)
Dept: FAMILY MEDICINE CLINIC | Facility: CLINIC | Age: 38
End: 2023-02-02

## 2023-02-02 ENCOUNTER — APPOINTMENT (OUTPATIENT)
Dept: ULTRASOUND IMAGING | Facility: HOSPITAL | Age: 38
End: 2023-02-02
Attending: STUDENT IN AN ORGANIZED HEALTH CARE EDUCATION/TRAINING PROGRAM

## 2023-02-02 VITALS
SYSTOLIC BLOOD PRESSURE: 132 MMHG | HEIGHT: 65.5 IN | HEART RATE: 78 BPM | OXYGEN SATURATION: 99 % | WEIGHT: 285 LBS | RESPIRATION RATE: 20 BRPM | BODY MASS INDEX: 46.91 KG/M2 | DIASTOLIC BLOOD PRESSURE: 82 MMHG | TEMPERATURE: 96 F

## 2023-02-02 VITALS
TEMPERATURE: 98 F | DIASTOLIC BLOOD PRESSURE: 100 MMHG | HEART RATE: 83 BPM | RESPIRATION RATE: 18 BRPM | OXYGEN SATURATION: 99 % | SYSTOLIC BLOOD PRESSURE: 168 MMHG

## 2023-02-02 DIAGNOSIS — R06.02 SOB (SHORTNESS OF BREATH) ON EXERTION: ICD-10-CM

## 2023-02-02 DIAGNOSIS — R06.01 ORTHOPNEA: ICD-10-CM

## 2023-02-02 DIAGNOSIS — R60.9 PERIPHERAL EDEMA: Primary | ICD-10-CM

## 2023-02-02 DIAGNOSIS — R60.0 BILATERAL LOWER EXTREMITY EDEMA: Primary | ICD-10-CM

## 2023-02-02 LAB
ALBUMIN SERPL-MCNC: 2.8 G/DL (ref 3.4–5)
ALBUMIN/GLOB SERPL: 0.7 {RATIO} (ref 1–2)
ALP LIVER SERPL-CCNC: 83 U/L
ALT SERPL-CCNC: 26 U/L
ANION GAP SERPL CALC-SCNC: 5 MMOL/L (ref 0–18)
AST SERPL-CCNC: 25 U/L (ref 15–37)
ATRIAL RATE: 77 BPM
B-HCG UR QL: NEGATIVE
BASOPHILS # BLD AUTO: 0.03 X10(3) UL (ref 0–0.2)
BASOPHILS NFR BLD AUTO: 0.4 %
BILIRUB SERPL-MCNC: 0.2 MG/DL (ref 0.1–2)
BILIRUB UR QL STRIP.AUTO: NEGATIVE
BUN BLD-MCNC: 26 MG/DL (ref 7–18)
CALCIUM BLD-MCNC: 8.7 MG/DL (ref 8.5–10.1)
CHLORIDE SERPL-SCNC: 111 MMOL/L (ref 98–112)
CLARITY UR REFRACT.AUTO: CLEAR
CO2 SERPL-SCNC: 24 MMOL/L (ref 21–32)
COLOR UR AUTO: YELLOW
CREAT BLD-MCNC: 0.82 MG/DL
EOSINOPHIL # BLD AUTO: 0.59 X10(3) UL (ref 0–0.7)
EOSINOPHIL NFR BLD AUTO: 7.3 %
ERYTHROCYTE [DISTWIDTH] IN BLOOD BY AUTOMATED COUNT: 14.7 %
GFR SERPLBLD BASED ON 1.73 SQ M-ARVRAT: 93 ML/MIN/1.73M2 (ref 60–?)
GLOBULIN PLAS-MCNC: 3.8 G/DL (ref 2.8–4.4)
GLUCOSE BLD-MCNC: 101 MG/DL (ref 70–99)
GLUCOSE UR STRIP.AUTO-MCNC: NEGATIVE MG/DL
HCT VFR BLD AUTO: 35.7 %
HGB BLD-MCNC: 11.6 G/DL
IMM GRANULOCYTES # BLD AUTO: 0.03 X10(3) UL (ref 0–1)
IMM GRANULOCYTES NFR BLD: 0.4 %
KETONES UR STRIP.AUTO-MCNC: NEGATIVE MG/DL
LEUKOCYTE ESTERASE UR QL STRIP.AUTO: NEGATIVE
LYMPHOCYTES # BLD AUTO: 2.36 X10(3) UL (ref 1–4)
LYMPHOCYTES NFR BLD AUTO: 29.2 %
MCH RBC QN AUTO: 28.1 PG (ref 26–34)
MCHC RBC AUTO-ENTMCNC: 32.5 G/DL (ref 31–37)
MCV RBC AUTO: 86.4 FL
MONOCYTES # BLD AUTO: 0.68 X10(3) UL (ref 0.1–1)
MONOCYTES NFR BLD AUTO: 8.4 %
NEUTROPHILS # BLD AUTO: 4.38 X10 (3) UL (ref 1.5–7.7)
NEUTROPHILS # BLD AUTO: 4.38 X10(3) UL (ref 1.5–7.7)
NEUTROPHILS NFR BLD AUTO: 54.3 %
NITRITE UR QL STRIP.AUTO: NEGATIVE
NT-PROBNP SERPL-MCNC: 17 PG/ML (ref ?–125)
OSMOLALITY SERPL CALC.SUM OF ELEC: 295 MOSM/KG (ref 275–295)
P AXIS: 70 DEGREES
P-R INTERVAL: 152 MS
PH UR STRIP.AUTO: 5.5 [PH] (ref 5–8)
PLATELET # BLD AUTO: 282 10(3)UL (ref 150–450)
POTASSIUM SERPL-SCNC: 3.4 MMOL/L (ref 3.5–5.1)
PROT SERPL-MCNC: 6.6 G/DL (ref 6.4–8.2)
PROT UR STRIP.AUTO-MCNC: >=300 MG/DL
Q-T INTERVAL: 426 MS
QRS DURATION: 96 MS
QTC CALCULATION (BEZET): 482 MS
R AXIS: -27 DEGREES
RBC # BLD AUTO: 4.13 X10(6)UL
RBC #/AREA URNS AUTO: >10 /HPF
SODIUM SERPL-SCNC: 140 MMOL/L (ref 136–145)
SP GR UR STRIP.AUTO: >=1.03 (ref 1–1.03)
T AXIS: 32 DEGREES
TROPONIN I HIGH SENSITIVITY: 5 NG/L
UROBILINOGEN UR STRIP.AUTO-MCNC: 0.2 MG/DL
VENTRICULAR RATE: 77 BPM
WBC # BLD AUTO: 8.1 X10(3) UL (ref 4–11)

## 2023-02-02 PROCEDURE — 81015 MICROSCOPIC EXAM OF URINE: CPT | Performed by: STUDENT IN AN ORGANIZED HEALTH CARE EDUCATION/TRAINING PROGRAM

## 2023-02-02 PROCEDURE — 93010 ELECTROCARDIOGRAM REPORT: CPT

## 2023-02-02 PROCEDURE — 93971 EXTREMITY STUDY: CPT | Performed by: STUDENT IN AN ORGANIZED HEALTH CARE EDUCATION/TRAINING PROGRAM

## 2023-02-02 PROCEDURE — 99285 EMERGENCY DEPT VISIT HI MDM: CPT

## 2023-02-02 PROCEDURE — 99213 OFFICE O/P EST LOW 20 MIN: CPT | Performed by: NURSE PRACTITIONER

## 2023-02-02 PROCEDURE — 99284 EMERGENCY DEPT VISIT MOD MDM: CPT

## 2023-02-02 PROCEDURE — 93005 ELECTROCARDIOGRAM TRACING: CPT

## 2023-02-02 PROCEDURE — 81001 URINALYSIS AUTO W/SCOPE: CPT | Performed by: STUDENT IN AN ORGANIZED HEALTH CARE EDUCATION/TRAINING PROGRAM

## 2023-02-02 PROCEDURE — 36415 COLL VENOUS BLD VENIPUNCTURE: CPT

## 2023-02-02 PROCEDURE — 85025 COMPLETE CBC W/AUTO DIFF WBC: CPT | Performed by: STUDENT IN AN ORGANIZED HEALTH CARE EDUCATION/TRAINING PROGRAM

## 2023-02-02 PROCEDURE — 71046 X-RAY EXAM CHEST 2 VIEWS: CPT | Performed by: STUDENT IN AN ORGANIZED HEALTH CARE EDUCATION/TRAINING PROGRAM

## 2023-02-02 PROCEDURE — 84484 ASSAY OF TROPONIN QUANT: CPT | Performed by: STUDENT IN AN ORGANIZED HEALTH CARE EDUCATION/TRAINING PROGRAM

## 2023-02-02 PROCEDURE — 81025 URINE PREGNANCY TEST: CPT

## 2023-02-02 PROCEDURE — 80053 COMPREHEN METABOLIC PANEL: CPT | Performed by: STUDENT IN AN ORGANIZED HEALTH CARE EDUCATION/TRAINING PROGRAM

## 2023-02-02 PROCEDURE — 83880 ASSAY OF NATRIURETIC PEPTIDE: CPT | Performed by: STUDENT IN AN ORGANIZED HEALTH CARE EDUCATION/TRAINING PROGRAM

## 2023-02-19 DIAGNOSIS — F90.0 ATTENTION DEFICIT HYPERACTIVITY DISORDER (ADHD), PREDOMINANTLY INATTENTIVE TYPE: ICD-10-CM

## 2023-02-19 DIAGNOSIS — Z51.81 MEDICATION MONITORING ENCOUNTER: ICD-10-CM

## 2023-02-20 DIAGNOSIS — F90.0 ATTENTION DEFICIT HYPERACTIVITY DISORDER (ADHD), PREDOMINANTLY INATTENTIVE TYPE: ICD-10-CM

## 2023-02-20 DIAGNOSIS — Z51.81 MEDICATION MONITORING ENCOUNTER: ICD-10-CM

## 2023-02-20 RX ORDER — DEXTROAMPHETAMINE SACCHARATE, AMPHETAMINE ASPARTATE, DEXTROAMPHETAMINE SULFATE AND AMPHETAMINE SULFATE 7.5; 7.5; 7.5; 7.5 MG/1; MG/1; MG/1; MG/1
30 TABLET ORAL EVERY MORNING
Qty: 30 TABLET | Refills: 0 | Status: SHIPPED | OUTPATIENT
Start: 2023-02-20

## 2023-02-20 RX ORDER — DEXTROAMPHETAMINE SACCHARATE, AMPHETAMINE ASPARTATE MONOHYDRATE, DEXTROAMPHETAMINE SULFATE AND AMPHETAMINE SULFATE 7.5; 7.5; 7.5; 7.5 MG/1; MG/1; MG/1; MG/1
30 CAPSULE, EXTENDED RELEASE ORAL DAILY
Qty: 30 CAPSULE | Refills: 0 | Status: SHIPPED | OUTPATIENT
Start: 2023-02-20 | End: 2023-03-22

## 2023-02-20 NOTE — TELEPHONE ENCOUNTER
amphetamine-dextroamphetamine ER (ADDERALL XR) 30 MG Oral Capsule SR 24 Hr [[id number]] (      Pt sent refill for regular adderral too. She could not request this one on mychart.         Pt would like sent to  [facility], [address] [phone], [phone]

## 2023-02-20 NOTE — TELEPHONE ENCOUNTER
LOV 02/02/23 with LUIS  ([address] [place] [address] 06/09/22 with Dr. Nico Russell - addressed ADHD)  Last labs 02/02/23  Last refill on 01/17/23, for #30 caps, with 0 refills  amphetamine-dextroamphetamine ER (ADDERALL XR) 30 MG Oral Capsule SR 24 Hr    No future appointments. Order(s) pending, please review. Thank you.   Beverley Rubinstein

## 2023-02-20 NOTE — TELEPHONE ENCOUNTER
Advised patient of LUIS's note below. Patient verbalized understanding. Pt stated she will be figuring out insurance tomorrow and will call office back to schedule appt once insurance figured out. No further questions at this time.

## 2023-02-20 NOTE — TELEPHONE ENCOUNTER
LOV 02/02/23 with APRN  ([address] [place] [address] 06/09/22 with Dr. Caballero Re - addressed ADHD)  Last labs 02/02/23  Last refill on 01/17/23, for #30 tabs, with 0 refills  amphetamine-dextroamphetamine 30 MG Oral Tab    No future appointments. Order(s) pending, please review. Thank you.   Kaushik Sanchez

## 2023-02-20 NOTE — TELEPHONE ENCOUNTER
From Dr. Donovan Crooks last ADHD note:  Pt is here to f/u on ADHD: Rx Adderall 30 mg early in the AM and then takes 30 mg XR mid morning       Rx's sent. Please have her schedule physical and ADHD follow up next month. Did she get her insurance figured out?

## 2023-03-21 DIAGNOSIS — Z51.81 MEDICATION MONITORING ENCOUNTER: ICD-10-CM

## 2023-03-21 DIAGNOSIS — F90.0 ATTENTION DEFICIT HYPERACTIVITY DISORDER (ADHD), PREDOMINANTLY INATTENTIVE TYPE: ICD-10-CM

## 2023-03-22 RX ORDER — DEXTROAMPHETAMINE SACCHARATE, AMPHETAMINE ASPARTATE, DEXTROAMPHETAMINE SULFATE AND AMPHETAMINE SULFATE 7.5; 7.5; 7.5; 7.5 MG/1; MG/1; MG/1; MG/1
30 TABLET ORAL EVERY MORNING
Qty: 30 TABLET | Refills: 0 | Status: SHIPPED | OUTPATIENT
Start: 2023-03-22

## 2023-03-22 RX ORDER — DEXTROAMPHETAMINE SACCHARATE, AMPHETAMINE ASPARTATE MONOHYDRATE, DEXTROAMPHETAMINE SULFATE AND AMPHETAMINE SULFATE 7.5; 7.5; 7.5; 7.5 MG/1; MG/1; MG/1; MG/1
30 CAPSULE, EXTENDED RELEASE ORAL DAILY
Qty: 30 CAPSULE | Refills: 0 | Status: SHIPPED | OUTPATIENT
Start: 2023-03-22 | End: 2023-04-21

## 2023-04-07 RX ORDER — DEXTROAMPHETAMINE SACCHARATE, AMPHETAMINE ASPARTATE, DEXTROAMPHETAMINE SULFATE AND AMPHETAMINE SULFATE 3.75; 3.75; 3.75; 3.75 MG/1; MG/1; MG/1; MG/1
30 TABLET ORAL DAILY
Qty: 60 TABLET | Refills: 0 | Status: SHIPPED | OUTPATIENT
Start: 2023-04-07 | End: 2023-05-07

## 2023-04-07 NOTE — TELEPHONE ENCOUNTER
Pt called stating loan does not have the 30 mg of adderall but does have the 15 mg tablets. Pt asking if the script can be changed to 15 mg tabs 2x day so pt can receive medication.      Medication: amphetamine-dextroamphetamine 30 MG Oral Tab    Location: [facility] [address] [facility] [phone], [phone]:

## 2023-04-07 NOTE — TELEPHONE ENCOUNTER
[address] #[phone] - confirmed that pt last picked up Rx 30 mg XR CAPS on 3/24/23  Pt did NOT  Rx 30 MG TABS - due to availability    Pharmacist confirmed they have 15 MG TABS in stock  Please send new Rx for TABS only    Order(s) pending, please review. Thank you.

## 2023-05-04 RX ORDER — DEXTROAMPHETAMINE SACCHARATE, AMPHETAMINE ASPARTATE, DEXTROAMPHETAMINE SULFATE AND AMPHETAMINE SULFATE 3.75; 3.75; 3.75; 3.75 MG/1; MG/1; MG/1; MG/1
30 TABLET ORAL DAILY
Qty: 60 TABLET | Refills: 0 | Status: SHIPPED | OUTPATIENT
Start: 2023-05-04 | End: 2023-06-03

## 2023-05-04 NOTE — TELEPHONE ENCOUNTER
Pt needs the following refilled:    amphetamine-dextroamphetamine ER (ADDERALL XR) 30 MG Oral Capsule SR 24 Hr [[id number]] (Order [id number])    Please send to:  [facility] #[id number] - [place] - [address], [phone]   [address] [place] [address]   Phone: [phone] Fax: [phone]     Thank you!

## 2023-05-04 NOTE — TELEPHONE ENCOUNTER
Routing to provider per protocol. amphetamine-dextroamphetamine 15 MG Oral Tab  Last refilled on 4/7/23 for #60  with 0 rf. Last labs 2/2/23. Last seen on 2/2/23 w/VISH. Future Appointments   Date Time Provider Ольга Cheng   5/10/2023  1:20 PM Anne-Marie Foreman MD [facility] Jarrett Alonso          Thank you.

## 2023-05-05 DIAGNOSIS — Z51.81 MEDICATION MONITORING ENCOUNTER: ICD-10-CM

## 2023-05-05 DIAGNOSIS — F90.0 ATTENTION DEFICIT HYPERACTIVITY DISORDER (ADHD), PREDOMINANTLY INATTENTIVE TYPE: ICD-10-CM

## 2023-05-05 RX ORDER — DEXTROAMPHETAMINE SACCHARATE, AMPHETAMINE ASPARTATE, DEXTROAMPHETAMINE SULFATE AND AMPHETAMINE SULFATE 3.75; 3.75; 3.75; 3.75 MG/1; MG/1; MG/1; MG/1
30 TABLET ORAL DAILY
Qty: 60 TABLET | Refills: 0 | Status: CANCELLED | OUTPATIENT
Start: 2023-05-05 | End: 2023-06-04

## 2023-05-06 ENCOUNTER — TELEPHONE (OUTPATIENT)
Dept: FAMILY MEDICINE CLINIC | Facility: CLINIC | Age: 38
End: 2023-05-06

## 2023-05-06 NOTE — TELEPHONE ENCOUNTER
Patient Comment: I need the 30 mg XR time release ones I cannot find it on my chart.        Last OV 2/2/23  Last refilled 3/22/23  #30  0 refills

## 2023-05-06 NOTE — TELEPHONE ENCOUNTER
Patient called for refill for:    amphetamine-dextroamphetamine ER (ADDERALL XR) 30 MG Oral Capsule SR 24 Hr     Per patient the last rx that was sent was he wrong one.     [id number] Alivia Da Silva, [address] [place] [address] [phone], [phone]    Please advise # [phone]

## 2023-05-08 ENCOUNTER — TELEPHONE (OUTPATIENT)
Dept: FAMILY MEDICINE CLINIC | Facility: CLINIC | Age: 38
End: 2023-05-08

## 2023-05-08 RX ORDER — DEXTROAMPHETAMINE SACCHARATE, AMPHETAMINE ASPARTATE MONOHYDRATE, DEXTROAMPHETAMINE SULFATE AND AMPHETAMINE SULFATE 7.5; 7.5; 7.5; 7.5 MG/1; MG/1; MG/1; MG/1
30 CAPSULE, EXTENDED RELEASE ORAL DAILY
Qty: 30 CAPSULE | Refills: 0 | Status: SHIPPED | OUTPATIENT
Start: 2023-05-08 | End: 2023-06-07

## 2023-05-08 NOTE — TELEPHONE ENCOUNTER
Pharmacy called stating rx for amphetamine-dextroamphetamine ER (ADDERALL XR) 30 MG Oral Capsule SR 24 Hr cannot be faxed, only e-script or original paper copy brought in.

## 2023-05-10 ENCOUNTER — TELEPHONE (OUTPATIENT)
Dept: FAMILY MEDICINE CLINIC | Facility: CLINIC | Age: 38
End: 2023-05-10

## 2023-05-10 ENCOUNTER — OFFICE VISIT (OUTPATIENT)
Dept: FAMILY MEDICINE CLINIC | Facility: CLINIC | Age: 38
End: 2023-05-10
Payer: MEDICAID

## 2023-05-10 VITALS
DIASTOLIC BLOOD PRESSURE: 110 MMHG | OXYGEN SATURATION: 99 % | SYSTOLIC BLOOD PRESSURE: 144 MMHG | HEART RATE: 109 BPM | WEIGHT: 282 LBS | BODY MASS INDEX: 46 KG/M2 | TEMPERATURE: 95 F

## 2023-05-10 DIAGNOSIS — F90.0 ATTENTION DEFICIT HYPERACTIVITY DISORDER (ADHD), PREDOMINANTLY INATTENTIVE TYPE: ICD-10-CM

## 2023-05-10 DIAGNOSIS — J18.9 COMMUNITY ACQUIRED PNEUMONIA, UNSPECIFIED LATERALITY: Primary | ICD-10-CM

## 2023-05-10 DIAGNOSIS — J45.41 MODERATE PERSISTENT ASTHMA WITH ACUTE EXACERBATION: ICD-10-CM

## 2023-05-10 DIAGNOSIS — Z76.0 ENCOUNTER FOR MEDICATION REFILL: ICD-10-CM

## 2023-05-10 PROCEDURE — 3080F DIAST BP >= 90 MM HG: CPT | Performed by: FAMILY MEDICINE

## 2023-05-10 PROCEDURE — 3077F SYST BP >= 140 MM HG: CPT | Performed by: FAMILY MEDICINE

## 2023-05-10 PROCEDURE — 99214 OFFICE O/P EST MOD 30 MIN: CPT | Performed by: FAMILY MEDICINE

## 2023-05-10 RX ORDER — PREDNISONE 20 MG/1
20 TABLET ORAL DAILY
Qty: 5 TABLET | Refills: 0 | Status: SHIPPED | OUTPATIENT
Start: 2023-05-10 | End: 2023-05-15

## 2023-05-10 RX ORDER — AZITHROMYCIN 250 MG/1
TABLET, FILM COATED ORAL
Qty: 6 TABLET | Refills: 0 | Status: SHIPPED | OUTPATIENT
Start: 2023-05-10 | End: 2023-05-15

## 2023-05-10 RX ORDER — CEPHALEXIN 500 MG/1
500 CAPSULE ORAL 3 TIMES DAILY
Qty: 30 CAPSULE | Refills: 0 | Status: SHIPPED | OUTPATIENT
Start: 2023-05-10 | End: 2023-05-20

## 2023-05-10 RX ORDER — ALBUTEROL SULFATE 90 UG/1
2 AEROSOL, METERED RESPIRATORY (INHALATION) EVERY 4 HOURS PRN
Qty: 8.5 G | Refills: 0 | Status: SHIPPED | OUTPATIENT
Start: 2023-05-10

## 2023-05-10 NOTE — TELEPHONE ENCOUNTER
Pt told to schedule f/u on 5/17 to have doc listen to lungs.  Dr. Palak Srivastava schedule full, requesting appt times for pt for next Wednesday 5/17

## 2023-05-17 ENCOUNTER — OFFICE VISIT (OUTPATIENT)
Dept: FAMILY MEDICINE CLINIC | Facility: CLINIC | Age: 38
End: 2023-05-17
Payer: MEDICAID

## 2023-05-17 VITALS
WEIGHT: 286 LBS | HEART RATE: 109 BPM | BODY MASS INDEX: 47 KG/M2 | SYSTOLIC BLOOD PRESSURE: 124 MMHG | TEMPERATURE: 97 F | DIASTOLIC BLOOD PRESSURE: 90 MMHG

## 2023-05-17 DIAGNOSIS — Z87.01 HISTORY OF PNEUMONIA: Primary | ICD-10-CM

## 2023-05-17 DIAGNOSIS — J98.01 BRONCHOSPASM: ICD-10-CM

## 2023-05-17 DIAGNOSIS — Z23 NEED FOR VACCINATION: ICD-10-CM

## 2023-05-17 DIAGNOSIS — J45.41 MODERATE PERSISTENT ASTHMA WITH ACUTE EXACERBATION: ICD-10-CM

## 2023-05-17 DIAGNOSIS — R03.0 ELEVATED BLOOD PRESSURE READING: ICD-10-CM

## 2023-05-17 PROCEDURE — 3080F DIAST BP >= 90 MM HG: CPT | Performed by: FAMILY MEDICINE

## 2023-05-17 PROCEDURE — 3074F SYST BP LT 130 MM HG: CPT | Performed by: FAMILY MEDICINE

## 2023-05-17 PROCEDURE — 99214 OFFICE O/P EST MOD 30 MIN: CPT | Performed by: FAMILY MEDICINE

## 2023-05-17 RX ORDER — PREDNISONE 20 MG/1
20 TABLET ORAL DAILY
Qty: 5 TABLET | Refills: 0 | Status: SHIPPED | OUTPATIENT
Start: 2023-05-17 | End: 2023-05-22

## 2023-06-14 RX ORDER — DEXTROAMPHETAMINE SACCHARATE, AMPHETAMINE ASPARTATE, DEXTROAMPHETAMINE SULFATE AND AMPHETAMINE SULFATE 3.75; 3.75; 3.75; 3.75 MG/1; MG/1; MG/1; MG/1
30 TABLET ORAL DAILY
Qty: 60 TABLET | Refills: 0 | Status: SHIPPED | OUTPATIENT
Start: 2023-06-14 | End: 2023-07-14

## 2023-06-14 NOTE — TELEPHONE ENCOUNTER
Please see note below    LOV 05/17/23  Last refill on 05/04/23, for #60 tabs, with 0 refills  amphetamine-dextroamphetamine 15 MG Oral Tab     Future Appointments   Date Time Provider Ольга Cheng   8/23/2023  1:20 PM Anne-Marie Barajas MD [facility] [place]utsman     Order(s) pending, please review. Thank you.   Claudene Hones

## 2023-06-14 NOTE — TELEPHONE ENCOUNTER
[facility] #[id number] - [place] - [address] [place] [address] [phone], [phone]   [address] [place] [address] [place] [address]   Phone: [phone] Fax: [phone]     PATIENT REQUESTING DR LOCKHART CHANGE THE RX. [facility] HAS ADDERALL 15MG.  PAHARMACY REQUESTING ADDERALL 15MG BID

## 2023-06-26 RX ORDER — DEXTROAMPHETAMINE SACCHARATE, AMPHETAMINE ASPARTATE MONOHYDRATE, DEXTROAMPHETAMINE SULFATE AND AMPHETAMINE SULFATE 7.5; 7.5; 7.5; 7.5 MG/1; MG/1; MG/1; MG/1
CAPSULE, EXTENDED RELEASE ORAL
Qty: 14 CAPSULE | Refills: 0 | Status: SHIPPED | OUTPATIENT
Start: 2023-06-26 | End: 2023-07-10

## 2023-06-26 NOTE — TELEPHONE ENCOUNTER
PT CALLED AND ADV IS NEEDS REFILL OF     amphetamine-dextroamphetamine ER (ADDERALL XR) 30 MG Oral Capsule SR 24 Hr     PLEASE SEND TO [facility]     THANK YOU

## 2023-06-26 NOTE — TELEPHONE ENCOUNTER
LOV 05/17/23  Last labs 02/02/23  Last refill on 05/08/23, for #30 caps, with 0 refills  amphetamine-dextroamphetamine ER (ADDERALL XR) 30 MG Oral Capsule SR 24 Hr     Future Appointments   Date Time Provider Ольга Cheng   8/23/2023  1:20 PM Anne-Marie Carr MD [facility] Nnamdi León     Order(s) pending, please review. Thank you.   Sue Paulino

## 2023-07-19 RX ORDER — DEXTROAMPHETAMINE SACCHARATE, AMPHETAMINE ASPARTATE, DEXTROAMPHETAMINE SULFATE AND AMPHETAMINE SULFATE 3.75; 3.75; 3.75; 3.75 MG/1; MG/1; MG/1; MG/1
30 TABLET ORAL DAILY
Qty: 60 TABLET | Refills: 0 | Status: SHIPPED | OUTPATIENT
Start: 2023-07-19 | End: 2023-08-18

## 2023-07-19 RX ORDER — DEXTROAMPHETAMINE SACCHARATE, AMPHETAMINE ASPARTATE MONOHYDRATE, DEXTROAMPHETAMINE SULFATE AND AMPHETAMINE SULFATE 7.5; 7.5; 7.5; 7.5 MG/1; MG/1; MG/1; MG/1
CAPSULE, EXTENDED RELEASE ORAL
Qty: 30 CAPSULE | Refills: 0 | Status: SHIPPED | OUTPATIENT
Start: 2023-07-19 | End: 2023-08-02

## 2023-07-19 NOTE — TELEPHONE ENCOUNTER
Reyes Garcia requesting Medication Refill for:    amphetamine-dextroamphetamine 15 MG Oral Tab   amphetamine-dextroamphetamine ER (ADDERALL XR) 30 MG Oral Capsule SR 24 Hr     LOV: 5/17/2023     Pharmacy:   [facility], [address] [facility] [phone], [phone]     Next Scheduled appointment: 8/23/2023

## 2023-08-22 RX ORDER — DEXTROAMPHETAMINE SACCHARATE, AMPHETAMINE ASPARTATE, DEXTROAMPHETAMINE SULFATE AND AMPHETAMINE SULFATE 3.75; 3.75; 3.75; 3.75 MG/1; MG/1; MG/1; MG/1
30 TABLET ORAL DAILY
Qty: 60 TABLET | Refills: 0 | Status: SHIPPED | OUTPATIENT
Start: 2023-08-22 | End: 2023-09-21

## 2023-08-22 RX ORDER — DEXTROAMPHETAMINE SACCHARATE, AMPHETAMINE ASPARTATE MONOHYDRATE, DEXTROAMPHETAMINE SULFATE AND AMPHETAMINE SULFATE 7.5; 7.5; 7.5; 7.5 MG/1; MG/1; MG/1; MG/1
CAPSULE, EXTENDED RELEASE ORAL
Qty: 30 CAPSULE | Refills: 0 | Status: SHIPPED | OUTPATIENT
Start: 2023-08-22 | End: 2023-09-05

## 2023-08-22 RX ORDER — CLONAZEPAM 0.5 MG/1
0.5 TABLET ORAL
Qty: 30 TABLET | Refills: 0 | Status: SHIPPED | OUTPATIENT
Start: 2023-08-22

## 2023-08-22 NOTE — TELEPHONE ENCOUNTER
CALLED PT FOR REMINDER CALL - ADV PT THAT SHE HAS A MEDICAID PLAN THAT WE ARE NOT IN NETWORK WITH. PT VERY UPSET AND EMOTIONAL - HAVING A VERY HARD TIME RIGHT NOW - EMOTIONALLY CRYING. PT HAD TO CANCEL APT. ADV PT TO CALL AND GET INSURANCE SWITCHED TO IN-NETWORK PLAN.     NEEDS REFILLS OF :     amphetamine-dextroamphetamine ER (ADDERALL XR) 30 MG Oral Capsule SR 24 Hr      amphetamine-dextroamphetamine 15 MG Oral Tab     clonazePAM 0.5 MG Oral Tab     PLEASE SEND TO [facility]       THANK YOU

## 2023-08-30 ENCOUNTER — TELEPHONE (OUTPATIENT)
Dept: FAMILY MEDICINE CLINIC | Facility: CLINIC | Age: 38
End: 2023-08-30

## 2023-08-30 NOTE — TELEPHONE ENCOUNTER
Called and spoke with pt - advised her of note below - pt reports she is currently in process of changing insurances to one that [facility] takes - pt thinks new insurance to be active in next 1-2 weeks    Inquired who provided letter past few years for pt - pt reports Dr. Hardik Barroso was providing letter up until she left, then Dr. Everardo Son provided letter for pt    Advised pt unable to locate letter from Dr. Everardo Son - last letter found was from Dr. 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   Please advise, thank you

## 2023-08-30 NOTE — TELEPHONE ENCOUNTER
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

## 2023-08-30 NOTE — TELEPHONE ENCOUNTER
Looking back, we have not discussed her ADHD medication since 6/2022. I am unable to provide a letter at this time. She will need an appt for physical and ADHD follow up for me to do this. Additionally, we will not be able to continue prescribing her ADHD treatment without an appt. I believe this was already conveyed but want to make sure she understands that.

## 2023-08-30 NOTE — TELEPHONE ENCOUNTER
Did patient switch Medicaid plans to one that we accept? She needs an appt for physical and medication follow up  If we are not going to be able to see her going forward, I can't provide letter that she is under our care.

## 2023-08-30 NOTE — TELEPHONE ENCOUNTER
Patient requesting a note for work stating that it's ok to operate a motor vehicle while taking Adderall    She states that she gets one every year    Please adv  Thank you

## 2023-09-01 NOTE — TELEPHONE ENCOUNTER
Advised pt we have not received fax from DOT provider at this time - she v/u and stated that she called them and is awaiting call back at this time    Advised patient of LUIS's note below. Patient verbalized understanding.      Pt reports she has always discussed ADHD medication at every in-office appointment - last office appt in May    Advised pt that no documentation discussing ADHD medication at last office visit - pt reports she has been following up with Dr. Jane Trevino for adhd med - at one point was being seen every 3 months  (Office visits 06/09/22, 09/12/22, 09/21/22, 12/16/22)    Pt reports still in process of correcting insurance - pt reports she did have physical scheduled, but due to insurance issue, had to cancel - once insurance figured out, pt will call office back to schedule annual physical appt / med follow-up    Pt reports due for refill - advised pt Rx sent to [facility] on 08/22/23 - she v/u    Routing to provider as [place] only

## 2023-09-01 NOTE — TELEPHONE ENCOUNTER
I looked closer at her last visits. She did discuss ADHD medication with Dr. Patric Fraser on 5/10/23. From note:  HPI:   This is a 34year old female coming in for medication follow up and refills and also here to discuss respiratory symptoms. Cough spasms with taking deep breaths. BP and temp noted today - chills + per the patient. Hx of pneumonia +, hx of chronic bronchitis and asthma and has had issues with respiratory system. She has not been feeling well overall and knows not to use her ADHD medication. Overall these ADHD medications have helped but she knows not to take this when ill. Has had not adverse effects with taking this medication. No cold extremities or chest pain. Denies any chest pain but has had a lot of phlegm +. Considering she is due for her ADHD follow up she would also like to checked out for her respiratory symptoms. DX:  1. Attention deficit hyperactivity disorder (ADHD), predominantly inattentive type  And   2. Encounter for medication refill  Medications will be refilled when due. Should not be on ADHD medication       I believe that she was not supposed to be taking medication at that time due to acute illness because then Dr. Patric Fraser refilled Adderall in June. We could look at form and see if appropriate to clear patient. Will need follow up no later than November for us to continue prescribing her Adderall. Hope this helps!

## 2023-10-09 ENCOUNTER — TELEPHONE (OUTPATIENT)
Dept: FAMILY MEDICINE CLINIC | Facility: CLINIC | Age: 38
End: 2023-10-09

## 2023-10-09 NOTE — TELEPHONE ENCOUNTER
Lab Frequency Next Occurrence   XR CHEST PA + LAT CHEST (CPT=71046) Once 05/17/2023     Letter mailed to patient reminding them they have outstanding orders.

## 2023-11-14 NOTE — TELEPHONE ENCOUNTER
From: Amilcar Allen  Sent: 9/9/2017 10:17 AM CDT  Subject: Medication Renewal Request    Nolan Mccartney.  Maricruz would like a refill of the following medications:  ipratropium-albuterol 0.5-2.5 (3) MG/3ML Inhalation Solution Evelio Tee MD]    Preferred phar 13-Nov-2023 20:00

## 2023-11-22 NOTE — TELEPHONE ENCOUNTER
Addended by: TREASURE REDDY on: 11/22/2023 03:52 PM     Modules accepted: Orders     Pt called, needs refill on amphetamine-dextroamphetamine (ADDERALL) 30 MG Oral Tab. Pt will  script.    Please call pt at [phone]

## 2024-03-28 ENCOUNTER — TELEPHONE (OUTPATIENT)
Dept: FAMILY MEDICINE CLINIC | Facility: CLINIC | Age: 39
End: 2024-03-28

## 2024-05-20 ENCOUNTER — PATIENT OUTREACH (OUTPATIENT)
Dept: FAMILY MEDICINE CLINIC | Facility: CLINIC | Age: 39
End: 2024-05-20

## 2024-07-08 ENCOUNTER — PATIENT OUTREACH (OUTPATIENT)
Dept: FAMILY MEDICINE CLINIC | Facility: CLINIC | Age: 39
End: 2024-07-08

## 2024-12-31 ENCOUNTER — TELEPHONE (OUTPATIENT)
Dept: FAMILY MEDICINE CLINIC | Facility: CLINIC | Age: 39
End: 2024-12-31

## 2024-12-31 RX ORDER — AZITHROMYCIN 250 MG/1
TABLET, FILM COATED ORAL
Qty: 6 TABLET | Refills: 0 | Status: SHIPPED | OUTPATIENT
Start: 2024-12-31 | End: 2025-01-05

## 2024-12-31 NOTE — TELEPHONE ENCOUNTER
Patient was exposed to whooping cough both on 12/25 and 12/30    Patient was advised by health department to get antibiotics    Please adv  Thank you

## 2024-12-31 NOTE — TELEPHONE ENCOUNTER
Spoke with patient who states she was exposed to her father who has whopping cough. Was close, prolonged exposure.  Per epic last tdap 1/7/2020. Not symptomatic currently  Uses [facility]    Routed to Dr Swain as covering provider to advise

## 2025-06-07 ENCOUNTER — HOSPITAL ENCOUNTER (OUTPATIENT)
Age: 40
Discharge: HOME | End: 2025-06-07
Payer: MEDICAID

## 2025-06-07 VITALS
TEMPERATURE: 97 F | SYSTOLIC BLOOD PRESSURE: 126 MMHG | RESPIRATION RATE: 18 BRPM | HEIGHT: 66 IN | OXYGEN SATURATION: 98 % | HEART RATE: 75 BPM | BODY MASS INDEX: 46 KG/M2 | DIASTOLIC BLOOD PRESSURE: 81 MMHG

## 2025-06-07 DIAGNOSIS — J06.9 VIRAL URI WITH COUGH: ICD-10-CM

## 2025-06-07 DIAGNOSIS — R50.9 FEVER: Primary | ICD-10-CM

## 2025-06-07 LAB
POCT INFLUENZA A: NEGATIVE
POCT INFLUENZA B: NEGATIVE

## 2025-06-07 RX ORDER — BENZONATATE 100 MG/1
100 CAPSULE ORAL 3 TIMES DAILY PRN
Qty: 30 CAPSULE | Refills: 0 | Status: SHIPPED | OUTPATIENT
Start: 2025-06-07 | End: 2025-07-07

## 2025-06-07 RX ORDER — PREDNISONE 20 MG/1
40 TABLET ORAL DAILY
Qty: 10 TABLET | Refills: 0 | Status: SHIPPED | OUTPATIENT
Start: 2025-06-07 | End: 2025-06-12

## 2025-06-07 NOTE — ED PROVIDER NOTES
Patient Seen in: [facility]        History  Chief Complaint   Patient presents with    Cough/URI    Fever    Body ache and/or chills     Stated Complaint: EAR INFECTION BOTH /  COUGHING, RUNNY NOISE, FEVER, CHILLS, BODYACHE    Subjective:   40-year-old female this past Tuesday started feeling sick.  States she had a lot of sinus congestion, runny nose, sneezing, cough.  Thought it might have been her allergies.  She does take Zyrtec and Flonase regularly.  However on Thursday she started having fevers as well as body aches and chills.  No known sick exposure at home.  Non-smoker.  She does have a history of asthma and has had some wheezing.  She has been taking her albuterol rescue inhaler.  States now both her ears hurt.  Feels that the congestion is getting worse.                      Objective:     Past Medical History:    Anxiety    Arthritis    Attention deficit disorder    Calculus of kidney    Chronic bronchitis (HCC)    Chronic fatigue    Migraines    Muscle weakness    bilateral LEs    Pseudotumor cerebri syndrome    Visual impairment    blurry vision              Past Surgical History:   Procedure Laterality Date    Tubal ligation                  No pertinent social history.            Review of Systems   Constitutional:  Positive for chills, fatigue and fever.   HENT:  Positive for congestion, rhinorrhea, sinus pressure and sinus pain.    Respiratory:  Positive for cough and wheezing.    Musculoskeletal:  Positive for myalgias.   All other systems reviewed and are negative.      Positive for stated complaint: EAR INFECTION BOTH /  COUGHING, RUNNY NOISE, FEVER, CHILLS, BODYACHE  Other systems are as noted in HPI.  Constitutional and vital signs reviewed.      All other systems reviewed and negative except as noted above.                  Physical Exam    ED Triage Vitals [06/07/25 1101]   /81   Pulse 75   Resp 18   Temp 97.4 °F (36.3 °C)   Temp src Oral   SpO2 98 %   O2 Device None  (Room air)       Current Vitals:   Vital Signs  BP: 126/81  Pulse: 75  Resp: 18  Temp: 97.4 °F (36.3 °C)  Temp src: Oral    Oxygen Therapy  SpO2: 98 %  O2 Device: None (Room air)            Physical Exam  Vitals and nursing note reviewed.   Constitutional:       General: She is not in acute distress.     Appearance: Normal appearance. She is not ill-appearing, toxic-appearing or diaphoretic.   HENT:      Head:      Jaw: No trismus.      Right Ear: Ear canal and external ear normal. A middle ear effusion is present.      Left Ear: Ear canal and external ear normal. A middle ear effusion is present.      Nose: Congestion present.      Right Sinus: Maxillary sinus tenderness present. No frontal sinus tenderness.      Left Sinus: Maxillary sinus tenderness present. No frontal sinus tenderness.      Mouth/Throat:      Lips: Pink. No lesions.      Mouth: Mucous membranes are moist. No oral lesions or angioedema.      Tongue: No lesions.      Palate: No lesions.      Pharynx: Oropharynx is clear. Uvula midline.   Cardiovascular:      Rate and Rhythm: Normal rate and regular rhythm.      Pulses: Normal pulses.      Heart sounds: Normal heart sounds.   Pulmonary:      Effort: Pulmonary effort is normal. No respiratory distress.      Breath sounds: Normal breath sounds. No stridor. No wheezing, rhonchi or rales.   Musculoskeletal:      Cervical back: Normal range of motion and neck supple.   Skin:     General: Skin is warm and dry.      Coloration: Skin is not jaundiced or pale.   Neurological:      Mental Status: She is alert and oriented to person, place, and time.   Psychiatric:         Behavior: Behavior normal.                 ED Course  Labs Reviewed   POCT FLU TEST - Normal    Narrative:     This assay is a rapid molecular in vitro test utilizing nucleic acid amplification of influenza A and B viral RNA.                           MDM             Medical Decision Making  Differential diagnosis initially included but was  not limited to: Influenza, other viral respiratory illness    Nontoxic adult female patient.  Not tachypneic or hypoxic, no conversational dyspnea, no adventitious breath sounds.  No other signs of respiratory distress.  Given that her sinus symptoms just started a few days ago, this is likely viral and not bacterial.  Declined COVID swab.  Will obtain flu swab.        Amount and/or Complexity of Data Reviewed  Labs: ordered. Decision-making details documented in ED Course.    Risk  Prescription drug management.        Disposition and Plan     Clinical Impression:  1. Fever    2. Viral URI with cough         Disposition:  Discharge  6/7/2025 11:46 am    Follow-up:  Madyson Shearer MD  [address] [facility] [address]  [phone]    Schedule an appointment as soon as possible for a visit       The emergency department    Go to   If symptoms worsen          Medications Prescribed:  Current Discharge Medication List        START taking these medications    Details   predniSONE 20 MG Oral Tab Take 2 tablets (40 mg total) by mouth daily for 5 days.  Qty: 10 tablet, Refills: 0      benzonatate 100 MG Oral Cap Take 1 capsule (100 mg total) by mouth 3 (three) times daily as needed for cough.  Qty: 30 capsule, Refills: 0                   Supplementary Documentation:

## 2025-06-07 NOTE — ED INITIAL ASSESSMENT (HPI)
Pt sts sinus congestion, cough, body aches, fever as high as 102, dull aching antionette ear pain for the past 6 days. 2 days ago ear pain worsened, fever continues.

## 2025-07-25 ENCOUNTER — OFFICE VISIT (OUTPATIENT)
Dept: FAMILY MEDICINE CLINIC | Facility: CLINIC | Age: 40
End: 2025-07-25
Payer: MEDICAID

## 2025-07-25 VITALS
SYSTOLIC BLOOD PRESSURE: 128 MMHG | TEMPERATURE: 98 F | BODY MASS INDEX: 40 KG/M2 | OXYGEN SATURATION: 97 % | WEIGHT: 248.81 LBS | RESPIRATION RATE: 20 BRPM | HEART RATE: 87 BPM | DIASTOLIC BLOOD PRESSURE: 70 MMHG

## 2025-07-25 DIAGNOSIS — Z23 NEED FOR VACCINATION: ICD-10-CM

## 2025-07-25 DIAGNOSIS — Z12.31 ENCOUNTER FOR SCREENING MAMMOGRAM FOR MALIGNANT NEOPLASM OF BREAST: ICD-10-CM

## 2025-07-25 DIAGNOSIS — J45.41 MODERATE PERSISTENT ASTHMA WITH ACUTE EXACERBATION (HCC): ICD-10-CM

## 2025-07-25 DIAGNOSIS — Z00.00 HEALTHY ADULT ON ROUTINE PHYSICAL EXAMINATION: Primary | ICD-10-CM

## 2025-07-25 DIAGNOSIS — N92.1 MENORRHAGIA WITH IRREGULAR CYCLE: ICD-10-CM

## 2025-07-25 DIAGNOSIS — F41.9 ANXIETY: ICD-10-CM

## 2025-07-25 DIAGNOSIS — Z00.01 ENCOUNTER FOR ROUTINE ADULT PHYSICAL EXAM WITH ABNORMAL FINDINGS: ICD-10-CM

## 2025-07-25 DIAGNOSIS — R42 DIZZINESS: ICD-10-CM

## 2025-07-25 PROBLEM — M76.71 PERONEAL TENDINITIS OF LOWER LEG, RIGHT: Status: ACTIVE | Noted: 2024-08-31

## 2025-07-25 PROBLEM — M76.61 ACHILLES TENDINITIS OF RIGHT LOWER EXTREMITY: Status: ACTIVE | Noted: 2024-08-31

## 2025-07-25 LAB
ALBUMIN SERPL-MCNC: 4 G/DL (ref 3.2–4.8)
ALBUMIN/GLOB SERPL: 1.4 {RATIO} (ref 1–2)
ALP LIVER SERPL-CCNC: 85 U/L (ref 37–98)
ALT SERPL-CCNC: 15 U/L (ref 10–49)
ANION GAP SERPL CALC-SCNC: 9 MMOL/L (ref 0–18)
AST SERPL-CCNC: 19 U/L (ref ?–34)
BASOPHILS # BLD AUTO: 0.02 X10(3) UL (ref 0–0.2)
BASOPHILS NFR BLD AUTO: 0.3 %
BILIRUB SERPL-MCNC: 0.3 MG/DL (ref 0.3–1.2)
BUN BLD-MCNC: 18 MG/DL (ref 9–23)
CALCIUM BLD-MCNC: 9 MG/DL (ref 8.7–10.6)
CHLORIDE SERPL-SCNC: 107 MMOL/L (ref 98–112)
CHOLEST SERPL-MCNC: 230 MG/DL (ref ?–200)
CO2 SERPL-SCNC: 24 MMOL/L (ref 21–32)
CREAT BLD-MCNC: 0.96 MG/DL (ref 0.55–1.02)
DEPRECATED HBV CORE AB SER IA-ACNC: 23 NG/ML (ref 50–306)
EGFRCR SERPLBLD CKD-EPI 2021: 76 ML/MIN/1.73M2 (ref 60–?)
EOSINOPHIL # BLD AUTO: 0.29 X10(3) UL (ref 0–0.7)
EOSINOPHIL NFR BLD AUTO: 3.8 %
ERYTHROCYTE [DISTWIDTH] IN BLOOD BY AUTOMATED COUNT: 14.5 %
FASTING PATIENT LIPID ANSWER: NO
FASTING STATUS PATIENT QL REPORTED: NO
GLOBULIN PLAS-MCNC: 2.9 G/DL (ref 2–3.5)
GLUCOSE BLD-MCNC: 88 MG/DL (ref 70–99)
HCT VFR BLD AUTO: 34.4 % (ref 35–48)
HDLC SERPL-MCNC: 62 MG/DL (ref 40–59)
HGB BLD-MCNC: 11 G/DL (ref 12–16)
IMM GRANULOCYTES # BLD AUTO: 0.03 X10(3) UL (ref 0–1)
IMM GRANULOCYTES NFR BLD: 0.4 %
IRON SATN MFR SERPL: 11 % (ref 15–50)
IRON SERPL-MCNC: 38 UG/DL (ref 50–170)
LDLC SERPL CALC-MCNC: 155 MG/DL (ref ?–100)
LYMPHOCYTES # BLD AUTO: 1.72 X10(3) UL (ref 1–4)
LYMPHOCYTES NFR BLD AUTO: 22.4 %
MCH RBC QN AUTO: 28.3 PG (ref 26–34)
MCHC RBC AUTO-ENTMCNC: 32 G/DL (ref 31–37)
MCV RBC AUTO: 88.4 FL (ref 80–100)
MONOCYTES # BLD AUTO: 0.43 X10(3) UL (ref 0.1–1)
MONOCYTES NFR BLD AUTO: 5.6 %
NEUTROPHILS # BLD AUTO: 5.18 X10 (3) UL (ref 1.5–7.7)
NEUTROPHILS # BLD AUTO: 5.18 X10(3) UL (ref 1.5–7.7)
NEUTROPHILS NFR BLD AUTO: 67.5 %
NONHDLC SERPL-MCNC: 168 MG/DL (ref ?–130)
OSMOLALITY SERPL CALC.SUM OF ELEC: 291 MOSM/KG (ref 275–295)
PLATELET # BLD AUTO: 320 10(3)UL (ref 150–450)
POTASSIUM SERPL-SCNC: 4.2 MMOL/L (ref 3.5–5.1)
PROT SERPL-MCNC: 6.9 G/DL (ref 5.7–8.2)
RBC # BLD AUTO: 3.89 X10(6)UL (ref 3.8–5.3)
SODIUM SERPL-SCNC: 140 MMOL/L (ref 136–145)
TOTAL IRON BINDING CAPACITY: 335 UG/DL (ref 250–425)
TRANSFERRIN SERPL-MCNC: 268 MG/DL (ref 250–380)
TRIGL SERPL-MCNC: 73 MG/DL (ref 30–149)
TSI SER-ACNC: 1.31 UIU/ML (ref 0.55–4.78)
VLDLC SERPL CALC-MCNC: 14 MG/DL (ref 0–30)
WBC # BLD AUTO: 7.7 X10(3) UL (ref 4–11)

## 2025-07-25 PROCEDURE — 83036 HEMOGLOBIN GLYCOSYLATED A1C: CPT | Performed by: FAMILY MEDICINE

## 2025-07-25 PROCEDURE — 83550 IRON BINDING TEST: CPT | Performed by: FAMILY MEDICINE

## 2025-07-25 PROCEDURE — 82728 ASSAY OF FERRITIN: CPT | Performed by: FAMILY MEDICINE

## 2025-07-25 PROCEDURE — 90471 IMMUNIZATION ADMIN: CPT | Performed by: FAMILY MEDICINE

## 2025-07-25 PROCEDURE — 90677 PCV20 VACCINE IM: CPT | Performed by: FAMILY MEDICINE

## 2025-07-25 PROCEDURE — 83540 ASSAY OF IRON: CPT | Performed by: FAMILY MEDICINE

## 2025-07-25 PROCEDURE — 84443 ASSAY THYROID STIM HORMONE: CPT | Performed by: FAMILY MEDICINE

## 2025-07-25 PROCEDURE — 85025 COMPLETE CBC W/AUTO DIFF WBC: CPT | Performed by: FAMILY MEDICINE

## 2025-07-25 PROCEDURE — 80053 COMPREHEN METABOLIC PANEL: CPT | Performed by: FAMILY MEDICINE

## 2025-07-25 PROCEDURE — 80061 LIPID PANEL: CPT | Performed by: FAMILY MEDICINE

## 2025-07-25 PROCEDURE — 99396 PREV VISIT EST AGE 40-64: CPT | Performed by: FAMILY MEDICINE

## 2025-07-25 RX ORDER — LIDOCAINE 50 MG/G
1 PATCH TOPICAL
COMMUNITY
Start: 2024-02-06

## 2025-07-25 RX ORDER — ESCITALOPRAM OXALATE 10 MG/1
10 TABLET ORAL DAILY
Qty: 30 TABLET | Refills: 1 | Status: SHIPPED | OUTPATIENT
Start: 2025-07-25

## 2025-07-25 RX ORDER — DICLOFENAC SODIUM 75 MG/1
75 TABLET, DELAYED RELEASE ORAL
COMMUNITY
Start: 2024-07-24 | End: 2025-07-25 | Stop reason: ALTCHOICE

## 2025-07-25 RX ORDER — LEVOFLOXACIN 750 MG/1
750 TABLET, FILM COATED ORAL DAILY
COMMUNITY
Start: 2025-01-24 | End: 2025-07-25 | Stop reason: ALTCHOICE

## 2025-07-25 RX ORDER — FLUTICASONE FUROATE, UMECLIDINIUM BROMIDE AND VILANTEROL TRIFENATATE 100; 62.5; 25 UG/1; UG/1; UG/1
1 POWDER RESPIRATORY (INHALATION) DAILY
Qty: 60 EACH | Refills: 0 | Status: SHIPPED | OUTPATIENT
Start: 2025-07-25

## 2025-07-25 RX ORDER — ALBUTEROL SULFATE 0.83 MG/ML
2.5 SOLUTION RESPIRATORY (INHALATION) EVERY 4 HOURS PRN
Qty: 25 EACH | Refills: 3 | Status: SHIPPED | OUTPATIENT
Start: 2025-07-25

## 2025-07-25 NOTE — PROGRESS NOTES
HPI:   Sulma Alcantara is a 40 year old female who presents for a complete physical exam. Symptoms: denies discharge, itching, burning or dysuria, periods are irregular, heavier after tubes were tied, sometimes happens twice a month, sometimes doesn't get it. Patient complains of issues below.     Has been dizzy lately, like she's going to pass out. She has h/o anemia.   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     Asthma: takes symbicort daily, 2 puffs twice daily. Breathing has not been good. Using neb 2-3 x day since January when she had pneumonia.     H/o adhd and anxiety. Doesn't want to go back on adderall. Has not used clonazepam in awhile, mainly around brother's death.   Regular anxiety and panic attacks.     Has been losing weight with diet, portion control. Has lost about 40 lbs in the past year. Would like to get under 200 lbs. Starting weight was 289 lbs.       Sulma Alcantara   1985  [id number]    ASTHMA CONTROL TEST   1 In the past 4 weeks, how much of the time did your asthma keep you from getting as much done at work, school or at home? 2    All of the time Most of the time Some of the time A little of the time None of the time    2 During the past 4 weeks, how often have you had shortness of breath? 1    More than once a day Once a day 3 to 6 times a week Once or twice a week Not at all    3 During the past 4 weeks, how often did your asthma symptoms (wheezing, coughing, shortness of breath, chest tightness or pain) wake you up at night or earlier than usual in the morning? 2    4 or more nights a week 2 or 3 nights a week Once a week Once or twice Not at all    4 During the past 4 weeks, how often have you used your rescue inhaler or nebulizer medication (such as albuterol)? 2    3 or more times per day 1 or 2 times per day 2 or 3 times per week Once a week or less Not at all    5 How would you rate your asthma control during the past 4 weeks? 1    Not controlled at all Poorly  controlled Somewhat controlled Well controlled Completely controlled            Total Score: 8         Immunization History   Administered Date(s) Administered    Covid-19 Vaccine Pfizer 30 mcg/0.3 ml 02/10/2021, 03/03/2021    Fluvirin, 3 Years & >, Im 09/01/2011, 09/04/2014    TDAP 01/07/2020   Pended Date(s) Pended    Pneumococcal Conjugate PCV20 05/17/2023   Deferred Date(s) Deferred    FLUZONE 6 months and older PFS 0.5 ml (44189) 10/26/2015     Wt Readings from Last 6 Encounters:   07/25/25 248 lb 12.8 oz (112.9 kg)   05/17/23 286 lb (129.7 kg)   05/10/23 282 lb (127.9 kg)   02/02/23 285 lb (129.3 kg)   09/12/22 278 lb 6 oz (126.3 kg)   06/09/22 272 lb (123.4 kg)     Body mass index is 40.16 kg/m².     Lab Results   Component Value Date     (H) 02/02/2023     (H) 12/16/2022    GLU 98 09/12/2022     Lab Results   Component Value Date    CHOLEST 242 (H) 07/21/2021    CHOLEST 271 (H) 01/06/2020    CHOLEST 249 (H) 05/26/2016     Lab Results   Component Value Date    HDL 54 07/21/2021    HDL 52 01/06/2020    HDL 54 05/26/2016     Lab Results   Component Value Date     (H) 07/21/2021     (H) 01/06/2020     (H) 05/26/2016     Lab Results   Component Value Date    AST 25 02/02/2023    AST 15 12/16/2022    AST 14 (L) 09/12/2022     Lab Results   Component Value Date    ALT 26 02/02/2023    ALT 21 12/16/2022    ALT 25 09/12/2022       Current Outpatient Medications   Medication Sig Dispense Refill    diclofenac 75 MG Oral Tab EC Take 1 tablet (75 mg total) by mouth. (Patient not taking: Reported on 7/25/2025)      levoFLOXacin 750 MG Oral Tab Take 1 tablet (750 mg total) by mouth daily. (Patient not taking: Reported on 7/25/2025)      lidocaine 5 % External Patch Apply 1 patch topically daily as needed.      clonazePAM 0.5 MG Oral Tab Take 1 tablet (0.5 mg total) by mouth daily as needed for Anxiety. (Patient not taking: Reported on 7/25/2025) 30 tablet 0    albuterol (PROAIR HFA) 108  (90 Base) MCG/ACT Inhalation Aero Soln Inhale 2 puffs into the lungs every 4 (four) hours as needed for Wheezing. 8.5 g 0    Budesonide-Formoterol Fumarate (SYMBICORT) 160-4.5 MCG/ACT Inhalation Aerosol Inhale 2 puffs into the lungs 2 (two) times daily. 1 each 1    Butalbital-APAP-Caffeine -40 MG Oral Cap Take 1 capsule by mouth every 4 (four) hours as needed. (Patient not taking: Reported on 7/25/2025) 20 capsule 0    ipratropium-albuterol 0.5-2.5 (3) MG/3ML Inhalation Solution Take 3 mL by nebulization every 4 (four) hours as needed. 90 mL 0    Respiratory Therapy Supplies (NEBULIZER) Does not apply Device Use as directed 1 Device 0    Fluticasone Propionate 50 MCG/ACT Nasal Suspension 2 sprays by Each Nare route in the morning. (Patient not taking: Reported on 7/25/2025)      cetirizine 10 MG Oral Tab Take 1 tablet (10 mg total) by mouth in the morning. (Patient not taking: Reported on 7/25/2025)      Cholecalciferol (VITAMIN D-3) 5000 UNITS Oral Tab Take 1 tablet (5,000 Units total) by mouth.      ferrous sulfate 325 (65 FE) MG Oral Tab EC Take 1 tablet (325 mg total) by mouth daily with breakfast.        Past Medical History:    Anxiety    Arthritis    Attention deficit disorder    Calculus of kidney    Chronic bronchitis (HCC)    Chronic fatigue    Migraines    Muscle weakness    bilateral LEs    Pseudotumor cerebri syndrome    Visual impairment    blurry vision      Past Surgical History:   Procedure Laterality Date    Tubal ligation        Family History   Problem Relation Age of Onset    Other (Other[other]) Father         kidney transplant (alports)    Hypertension Father     Heart Disorder Father 56        CABGx3    Psychiatric Mother         alcoholic    Cancer Sister 32        melanoma    Other (Other[other]) Other         kidney transplant (alports)      Social History:   Social History     Socioeconomic History    Marital status: Life Partner   Tobacco Use    Smoking status: Some Days      Current packs/day: 0.25     Average packs/day: 0.3 packs/day for 16.0 years (4.0 ttl pk-yrs)     Types: Cigarettes    Smokeless tobacco: Never    Tobacco comments:     smokes occasionally   Vaping Use    Vaping status: Never Used   Substance and Sexual Activity    Alcohol use: Yes     Alcohol/week: 0.0 standard drinks of alcohol     Comment: rare 3 x per year     Drug use: Not Currently   Other Topics Concern    Caffeine Concern Yes     Comment: coffee    Exercise No     Social Drivers of Health     Food Insecurity: No Food Insecurity (7/25/2025)    NCSS - Food Insecurity     Worried About Running Out of Food in the Last Year: No     Ran Out of Food in the Last Year: No   Transportation Needs: No Transportation Needs (7/25/2025)    NCSS - Transportation     Lack of Transportation: No   Housing Stability: Not At Risk (7/25/2025)    NCSS - Housing/Utilities     Has Housing: Yes     Worried About Losing Housing: No     Unable to Get Utilities: No     Occ: [employer]. : no. Children: lives at home with boyfriend and son 50% of the time, boyfriends sister and his dad, boyfriends girls twice a month. .   Exercise: walking, lifting at work.  Diet: eating healthier, watching portions     REVIEW OF SYSTEMS:   GENERAL: feels well otherwise  SKIN: denies any unusual skin lesions  EYES:denies blurred vision or double vision  HEENT: denies nasal congestion, sinus pain or ST  LUNGS: denies shortness of breath with exertion  CARDIOVASCULAR: denies chest pain on exertion  GI: denies abdominal pain,denies heartburn  : denies dysuria, vaginal discharge or itching,periods irregular   MUSCULOSKELETAL: denies back pain or joint pain   NEURO: denies headaches, see HPI   PSYCHE: see HPI     EXAM:   /70   Pulse 87   Temp 97.6 °F (36.4 °C) (Temporal)   Resp 20   Wt 248 lb 12.8 oz (112.9 kg)   LMP 06/15/2025   SpO2 97%   BMI 40.16 kg/m²   Body mass index is 40.16 kg/m².   GENERAL: well developed, well nourished,in no  apparent distress  SKIN: no rashes,no suspicious lesions  HEENT: atraumatic, normocephalic,ears and throat are clear  EYES:PERRLA, EOMI, conjunctiva are clear  NECK: supple,no adenopathy,   CHEST: no chest tenderness  LUNGS: clear to auscultation  CARDIO: RRR without murmur  GI: good BS's,no masses, HSM or tenderness  :not done, deferred to gyne   MUSCULOSKELETAL: back is not tender,FROM of the back  EXTREMITIES: no cyanosis, clubbing or edema  NEURO: Oriented times three,cranial nerves are intact,motor and sensory are grossly intact    ASSESSMENT AND PLAN:   Sulma Alcantara is a 40 year old female who presents for a complete physical exam.  Pap and pelvic done. Order put in for mammogram. Self breast exam explained. Health maintenance, will check fasting Lipids, CMP, and CBC. Pt not due for screening colonoscopy.  The patient indicates understanding of these issues and agrees to the plan.  The patient is asked to return for CPX in 1 yr or sooner if needed.    Encounter Diagnoses   Name Primary?    Healthy adult on routine physical examination Yes    Encounter for routine adult physical exam with abnormal findings     Dizziness     Encounter for screening mammogram for malignant neoplasm of breast     Menorrhagia with irregular cycle     Moderate persistent asthma with acute exacerbation (HCC)     Need for vaccination     Anxiety    Refer to pulm for asthma control, try changing to trelegy in the mean time. Nebs refilled.   - refer to gyne for bleeding, consider ablation? Pap per gyne.   - will likely need hematology, assuming still anemic and she is symptomatic.   - labs today.   - start lexapro as ordered.   Discussed side effects including headaches, dizziness, anxiety, depression, GI symptoms, suicidal ideations, increased aggressiveness.   Call if concerning symptoms or any thoughts of harming herself or other. Pt verbalized understanding.   - follow up in 4-6 weeks for med follow up. Call sooner if needed.        Orders Placed This Encounter   Procedures    CBC With Differential With Platelet    Comp Metabolic Panel (14)    Hemoglobin A1C    Lipid Panel    TSH W Reflex To Free T4    Ferritin    Iron And Tibc    Prevnar 20 (PCV20) [76382]    VENIPUNCTURE       Meds & Refills for this Visit:  Requested Prescriptions     Signed Prescriptions Disp Refills    escitalopram 10 MG Oral Tab 30 tablet 1     Sig: Take 1 tablet (10 mg total) by mouth daily. 1/2 tab po qd x 4-6 days, then increase to 1 tab po qd    fluticasone-umeclidin-vilant (TRELEGY ELLIPTA) 100-62.5-25 MCG/ACT Inhalation Aerosol Powder, Breath Activated 60 each 0     Sig: Inhale 1 puff into the lungs daily.    albuterol (2.5 MG/3ML) 0.083% Inhalation Nebu Soln 25 each 3     Sig: Take 3 mL (2.5 mg total) by nebulization every 4 (four) hours as needed for Wheezing.       Imaging & Consults:  OBG - INTERNAL  PULMONARY - INTERNAL  PCV20 VACCINE FOR INTRAMUSCULAR USE  [facility] ILIR 2D+3D SCREENING BILAT (CPT=77067/13096)

## 2025-07-26 LAB
EST. AVERAGE GLUCOSE BLD GHB EST-MCNC: 117 MG/DL (ref 68–126)
HBA1C MFR BLD: 5.7 % (ref ?–5.7)

## 2025-08-06 ENCOUNTER — HOSPITAL ENCOUNTER (OUTPATIENT)
Dept: MAMMOGRAPHY | Age: 40
Discharge: HOME | End: 2025-08-06
Attending: FAMILY MEDICINE

## 2025-08-06 DIAGNOSIS — Z12.31 ENCOUNTER FOR SCREENING MAMMOGRAM FOR MALIGNANT NEOPLASM OF BREAST: ICD-10-CM

## 2025-08-06 PROCEDURE — 77067 SCR MAMMO BI INCL CAD: CPT | Performed by: FAMILY MEDICINE

## 2025-08-06 PROCEDURE — 77063 BREAST TOMOSYNTHESIS BI: CPT | Performed by: FAMILY MEDICINE

## 2025-08-29 ENCOUNTER — HOSPITAL ENCOUNTER (OUTPATIENT)
Dept: MAMMOGRAPHY | Facility: HOSPITAL | Age: 40
Discharge: HOME | End: 2025-08-29
Attending: FAMILY MEDICINE

## 2025-08-29 DIAGNOSIS — R92.2 INCONCLUSIVE MAMMOGRAM: ICD-10-CM

## 2025-08-29 PROCEDURE — 76642 ULTRASOUND BREAST LIMITED: CPT | Performed by: FAMILY MEDICINE

## 2025-08-29 PROCEDURE — 77066 DX MAMMO INCL CAD BI: CPT | Performed by: FAMILY MEDICINE

## 2025-08-29 PROCEDURE — 77062 BREAST TOMOSYNTHESIS BI: CPT | Performed by: FAMILY MEDICINE

## (undated) NOTE — LETTER
Date: 10/11/2022    Patient Name: Sean Villagran    To Whom it may concern: This letter has been written at the patient's request. The above patient has been receiving care at the  [facility] for treatment of a medical condition. Patient's symptoms have improved. She may return to work on 10/17/2022. No restrictions or limitations.        Sincerely,          Anne-Marie Jose MD

## (undated) NOTE — LETTER
Date: 10/15/2022    Patient Name: Jered Michelle    To Whom it may concern: This letter has been written at the patient's request. The above patient has been receiving care at the  [facility] for treatment of a medical condition. Patient's symptoms have improved. She may return to work on 10/24/2022. No restrictions or limitations.        Sincerely,          Anne-Marie Boyle MD

## (undated) NOTE — MR AVS SNAPSHOT
[address] [facility]  [place] [address]  [phone]               Thank you for choosing us for your health care visit with WILMAN Garcia.   We are glad to serve you and happy to provide you wi passes into the blood vessels surrounding the sacs. Your blood then delivers oxygen to all of your cells. Carbon dioxide is removed from the body in a similar way, as you exhale. When you have asthma  People with asthma have very sensitive airways.  This m medication as prescribed:  · Severe difficulty breathing  · Being too short of breath to talk or walk  · Lips or fingers turning blue  · Feeling lightheaded or dizzy, as though you are about to pass out  · Peak flow less than 50% of your personal best, if Inhale 2 puffs into the lungs 2 (two) times daily.    Commonly known as:  SYMBICORT           ClonazePAM 0.5 MG Tabs   TAKE 1 TABLET BY MOUTH EVERY DAY AS NEEDED FOR ANXIETY   Commonly known as:  KLONOPIN           ferrous sulfate 325 (65 FE) MG Tbec   Take

## (undated) NOTE — LETTER
10/30/19        Evelyn Zeng  [address]  [address] [facility] [place] [address]      Dear Librado Quintana,    [address] [facility] records indicate that you have outstanding lab work and or testing that was ordered for you and has not yet been completed:  Lab Frequency Next Occurrence

## (undated) NOTE — MR AVS SNAPSHOT
[address] [address]  [phone]               Thank you for choosing us for your health care visit with Gussie Holstein, MD.  We are glad to serve you and happy to provide you with this sum Amoxicillin-Pot Clavulanate 875-125 MG Tabs   Take 1 tablet by mouth 2 (two) times daily. Commonly known as:  AUGMENTIN           amphetamine-dextroamphetamine 30 MG Tabs   Take 1 tablet (30 mg total) by mouth 2 (two) times daily.    Commonly known as: - Montelukast Sodium 10 MG Tabs  - nystatin 738283 UNIT/ML Susp      You can get these medications from any pharmacy     Bring a paper prescription for each of these medications    - amphetamine-dextroamphetamine 30 MG Tabs            Kacyt     Call the

## (undated) NOTE — LETTER
[address] [facility] [address]           Dear Pk Beltran     Our records indicate that you have outstanding lab work and or testing that was ordered for you and has not yet been completed:  Lab Frequency Next Occurrence   XR CHEST PA + LAT CHEST (CPT=71046) Once 05/17/2023      To provide you with the best possible care, please complete these orders at your earliest convenience. If you have recently completed these orders please disregard this letter. If you have any questions please call the office at [phone].      Thank you,     [facility]

## (undated) NOTE — LETTER
Date: 9/12/2022    Patient Name: Dane Azevedo      To Whom it may concern: This letter has been written at the patient's request. The above patient was seen at the [facility] for treatment of a medical condition. This patient should be excused from attending work from 09/12/2022 through 09/15/2022. The patient may return to work on 9/16/2022 if symptoms have resolved.        Sincerely,          Anne-Marie Nance MD

## (undated) NOTE — LETTER
Date: 2018    Patient Name: Meaghan Stanley  : 1985      To Whom it may concern: The above patient was seen at the [facility] for treatment of a medical condition.     This patient should be excused from attending work 11/10/1

## (undated) NOTE — LETTER
[address]   [address] [place] [address]           Dear [address]     Our records indicate that you have outstanding lab work and or testing that was ordered for you and has not yet been completed:  Lab Frequency Next Occurrence   CBC WITH DIFFERENTIAL WITH PLATELET Once 92/23/1360   COMP METABOLIC PANEL (14) Once 02/79/0365   TSH W REFLEX TO FREE T4 Once 06/09/2022   LIPID PANEL Once 06/09/2022   HEMOGLOBIN A1C Once 06/09/2022   VITAMIN D Once 06/09/2022      To provide you with the best possible care, please complete these orders at your earliest convenience. If you have recently completed these orders please disregard this letter. If you have any questions please call the office at [phone].      Thank you,     [facility]

## (undated) NOTE — MR AVS SNAPSHOT
[address] [address]  [phone]               Thank you for choosing us for your health care visit with Javi Zamorano MD.  We are glad to serve you and happy to provide you with this sum ferrous sulfate 325 (65 FE) MG Tbec   Take 325 mg by mouth daily with breakfast.           TraMADol HCl 50 MG Tabs   1-2 po q6hrs prn severe pain max 8 in 24 hour period   Commonly known as:  ULTRAM           Vitamin D-3 5000 units Tabs   Take 1 tablet Start activities slowly and build up over time Do what you like   Get your heart pumping – brisk walking, biking, swimming Even 10 minute increments are effective and add up over the week   2 ½ hours per week – spread out over time Use a jerel to keep you

## (undated) NOTE — LETTER
01/02/19        Deon Estrada  [address] [facility] [address]      Dear Kanchan Ser,    [address] [facility] records indicate that you have outstanding lab work and or testing that was ordered for you and has not yet been completed:  Lab Frequency Next Occurrence

## (undated) NOTE — LETTER
Date: 10/11/2022    Patient Name: Stevo Stands    To Whom it may concern: This letter has been written at the patient's request. The above patient has been receiving care at the  [facility] for treatment of a medical condition. Patient's symptoms have improved. She may return to work on 10/12/2022. No restrictions or limitations.        Sincerely,          Anne-Marie Marroquin MD

## (undated) NOTE — LETTER
Date: 6/14/2022    Patient Name: Melecio Valencia      To Whom it may concern: This letter has been written at the patient's request.     This patient should be excused from attending work/school from 5/31/2022 to 6/8/2022. Per patient now symptoms have improved, and she has returned to work as of 6/9/2022. This letter serves as information. Use with discretion.      Sincerely,          Mydhili Ulysses Loupe, MD

## (undated) NOTE — MR AVS SNAPSHOT
[address] [address]  [phone]               Thank you for choosing us for your health care visit with Tyler Reyes MD.  We are glad to serve you and happy to provide you with this sum TAKE ONE TABLET BY MOUTH EVERY DAY AS NEEDED FOR ANXIETY   Commonly known as:  KLONOPIN           ferrous sulfate 325 (65 FE) MG Tbec   Take 325 mg by mouth daily with breakfast.           predniSONE 20 MG Tabs   3 po today, then 2 po x 4 days   Commonly k

## (undated) NOTE — LETTER
Date: 12/16/2022    Patient Name: Brianna Osborne          To Whom it may concern: This letter has been written at the patient's request. The above patient was seen at the [facility] for treatment of a medical condition. This patient should be excused from attending work/school from 12/11/2022 through 12/20/2022. The patient may return to work/school on 12/21/2022, once labs are back and cleared.         Sincerely,          Anne-Marie Phillips MD

## (undated) NOTE — Clinical Note
[address] [facility]. [address]  [phone]        5/18/2017        Dedra Alanis  [address] [place] [address]      Dear Dedra Alanis,      To help us provide t

## (undated) NOTE — LETTER
Patient Name: Willis Zacarias  : 1985  MRN: [id number]  Patient Address: [address] [place] [address] [place] [address]      Coronavirus Disease 2019 (COVID-19)     [facility] is committed to the safety and well-being of our patients, mary jane 2. Monitor your symptoms carefully. If your symptoms get worse, call your healthcare provider immediately. 3. Get rest and stay hydrated.    4. If you have a medical appointment, call the healthcare provider ahead of time and tell them that you have or may ? At least 24 hours have passed since recovery defined as resolution of fever without the use of fever-reducing medications; and  · Improvement in respiratory symptoms (e.g., cough, shortness of breath); and  · At least 10 days have passed since symptoms f If you would be interested in donating your plasma to help treat others diagnosed with the virus, please contact [facility] directly on their website: [email or web address]    Who is eligible to donate convalescent plasma?

## (undated) NOTE — MR AVS SNAPSHOT
[address] [facility] [address]  [phone]               Thank you for choosing us for your health care visit [facility]   We are glad to serve you and happy to provide you with this Commonly known as:  SYMBICORT           ClonazePAM 0.5 MG Tabs   TAKE 1 TABLET BY MOUTH EVERY DAY AS NEEDED FOR ANXIETY   Commonly known as:  KLONOPIN           ferrous sulfate 325 (65 FE) MG Tbec   Take 325 mg by mouth daily with breakfast.           August Frazier

## (undated) NOTE — LETTER
Date: 2019    Patient Name: Amilcar Allen  : 1985      To Whom it may concern: The protein and blood in patient's urine will not impact her ability to drive. She is safe to drive from my standpoint.     Sincerely,          MAT Rojas

## (undated) NOTE — LETTER
Date: 12/3/2018    Patient Name: Ravindra Lipoma  : 1985      To Whom it may concern:    Guy Roberto was seen in my office today. Please excuse her from work 18, 18, 18, 18, 12/3/18. She may return to work 18.       Sincerely,

## (undated) NOTE — MR AVS SNAPSHOT
[address] [address]  [phone]               Thank you for choosing us for your health care visit with Fatemeh Parisi MD.  We are glad to serve you and happy to provide you with this sum Doxycycline Nausea and vomiting, Swelling    Hand swelling and vomiting    Latex Itching, Rash                Today's Vital Signs     BP Pulse Temp Weight          110/70 mmHg 80 97.5 °F (36.4 °C) (Temporal) 251 lb           Current Medications          T